# Patient Record
Sex: FEMALE | Race: WHITE | Employment: OTHER | URBAN - METROPOLITAN AREA
[De-identification: names, ages, dates, MRNs, and addresses within clinical notes are randomized per-mention and may not be internally consistent; named-entity substitution may affect disease eponyms.]

---

## 2018-07-23 ENCOUNTER — HOSPITAL ENCOUNTER (OUTPATIENT)
Age: 48
Discharge: HOME | End: 2018-07-25
Payer: COMMERCIAL

## 2018-07-23 PROBLEM — Z91.09 ENVIRONMENTAL ALLERGIES: Status: ACTIVE | Noted: 2018-07-23

## 2018-07-23 PROBLEM — F17.211 CIGARETTE NICOTINE DEPENDENCE IN REMISSION: Status: ACTIVE | Noted: 2018-07-23

## 2018-07-23 PROBLEM — J45.30 MILD PERSISTENT ASTHMA WITHOUT COMPLICATION: Status: ACTIVE | Noted: 2018-07-23

## 2018-07-23 PROBLEM — J30.89 CHRONIC NON-SEASONAL ALLERGIC RHINITIS: Status: ACTIVE | Noted: 2018-07-23

## 2018-07-24 LAB
BASOPHILS ABSOLUTE: 0.09 E9/L (ref 0–0.2)
BASOPHILS RELATIVE PERCENT: 0.9 % (ref 0–2)
EOSINOPHILS ABSOLUTE: 0.17 E9/L (ref 0.05–0.5)
EOSINOPHILS RELATIVE PERCENT: 1.7 % (ref 0–6)
HCT VFR BLD CALC: 45 % (ref 34–48)
HEMOGLOBIN: 14.1 G/DL (ref 11.5–15.5)
IMMATURE GRANULOCYTES #: 0.08 E9/L
IMMATURE GRANULOCYTES %: 0.8 % (ref 0–5)
LYMPHOCYTES ABSOLUTE: 2.37 E9/L (ref 1.5–4)
LYMPHOCYTES RELATIVE PERCENT: 23.7 % (ref 20–42)
MCH RBC QN AUTO: 32 PG (ref 26–35)
MCHC RBC AUTO-ENTMCNC: 31.3 % (ref 32–34.5)
MCV RBC AUTO: 102 FL (ref 80–99.9)
MONOCYTES ABSOLUTE: 0.6 E9/L (ref 0.1–0.95)
MONOCYTES RELATIVE PERCENT: 6 % (ref 2–12)
NEUTROPHILS ABSOLUTE: 6.69 E9/L (ref 1.8–7.3)
NEUTROPHILS RELATIVE PERCENT: 66.9 % (ref 43–80)
PDW BLD-RTO: 12.9 FL (ref 11.5–15)
PLATELET # BLD: 366 E9/L (ref 130–450)
PMV BLD AUTO: 12.7 FL (ref 7–12)
RBC # BLD: 4.41 E12/L (ref 3.5–5.5)
WBC # BLD: 10 E9/L (ref 4.5–11.5)

## 2018-07-24 PROCEDURE — 82785 ASSAY OF IGE: CPT

## 2018-07-24 PROCEDURE — 85025 COMPLETE CBC W/AUTO DIFF WBC: CPT

## 2018-07-24 PROCEDURE — 86003 ALLG SPEC IGE CRUDE XTRC EA: CPT

## 2018-07-31 LAB
Lab: NORMAL
REPORT: NORMAL
THIS TEST SENT TO: NORMAL

## 2019-03-18 ENCOUNTER — OFFICE VISIT (OUTPATIENT)
Dept: NEUROLOGY | Age: 49
End: 2019-03-18

## 2019-03-18 VITALS
SYSTOLIC BLOOD PRESSURE: 130 MMHG | BODY MASS INDEX: 26.66 KG/M2 | HEIGHT: 69 IN | WEIGHT: 180 LBS | DIASTOLIC BLOOD PRESSURE: 90 MMHG | RESPIRATION RATE: 12 BRPM

## 2019-03-18 PROCEDURE — 99204 OFFICE O/P NEW MOD 45 MIN: CPT | Performed by: PSYCHIATRY & NEUROLOGY

## 2019-03-18 RX ORDER — SUMATRIPTAN 50 MG/1
50 TABLET, FILM COATED ORAL
Qty: 9 TABLET | Refills: 2 | Status: SHIPPED | OUTPATIENT
Start: 2019-03-18 | End: 2019-05-08

## 2019-03-18 ASSESSMENT — ENCOUNTER SYMPTOMS
ALLERGIC/IMMUNOLOGIC NEGATIVE: 1
GASTROINTESTINAL NEGATIVE: 1
RESPIRATORY NEGATIVE: 1
EYES NEGATIVE: 1

## 2020-12-17 ENCOUNTER — APPOINTMENT (OUTPATIENT)
Dept: GENERAL RADIOLOGY | Age: 50
DRG: 493 | End: 2020-12-17
Payer: COMMERCIAL

## 2020-12-17 ENCOUNTER — HOSPITAL ENCOUNTER (INPATIENT)
Age: 50
LOS: 1 days | Discharge: HOME | DRG: 493 | End: 2020-12-20
Attending: EMERGENCY MEDICINE | Admitting: INTERNAL MEDICINE
Payer: COMMERCIAL

## 2020-12-17 PROBLEM — S93.05XA ANKLE DISLOCATION, LEFT, INITIAL ENCOUNTER: Status: ACTIVE | Noted: 2020-12-17

## 2020-12-17 PROCEDURE — 96376 TX/PRO/DX INJ SAME DRUG ADON: CPT

## 2020-12-17 PROCEDURE — 73610 X-RAY EXAM OF ANKLE: CPT

## 2020-12-17 PROCEDURE — 99285 EMERGENCY DEPT VISIT HI MDM: CPT

## 2020-12-17 PROCEDURE — 96375 TX/PRO/DX INJ NEW DRUG ADDON: CPT

## 2020-12-17 PROCEDURE — 27762 CLTX MED ANKLE FX W/MNPJ: CPT

## 2020-12-17 PROCEDURE — G0378 HOSPITAL OBSERVATION PER HR: HCPCS

## 2020-12-17 PROCEDURE — 96374 THER/PROPH/DIAG INJ IV PUSH: CPT

## 2020-12-17 PROCEDURE — 6370000000 HC RX 637 (ALT 250 FOR IP): Performed by: INTERNAL MEDICINE

## 2020-12-17 PROCEDURE — 6360000002 HC RX W HCPCS: Performed by: STUDENT IN AN ORGANIZED HEALTH CARE EDUCATION/TRAINING PROGRAM

## 2020-12-17 PROCEDURE — 6360000002 HC RX W HCPCS: Performed by: INTERNAL MEDICINE

## 2020-12-17 PROCEDURE — 2500000003 HC RX 250 WO HCPCS: Performed by: STUDENT IN AN ORGANIZED HEALTH CARE EDUCATION/TRAINING PROGRAM

## 2020-12-17 PROCEDURE — 73600 X-RAY EXAM OF ANKLE: CPT

## 2020-12-17 PROCEDURE — 6360000002 HC RX W HCPCS

## 2020-12-17 RX ORDER — SODIUM CHLORIDE 0.9 % (FLUSH) 0.9 %
10 SYRINGE (ML) INJECTION EVERY 12 HOURS SCHEDULED
Status: DISCONTINUED | OUTPATIENT
Start: 2020-12-17 | End: 2020-12-20 | Stop reason: HOSPADM

## 2020-12-17 RX ORDER — ZOLPIDEM TARTRATE 5 MG/1
5 TABLET ORAL NIGHTLY PRN
Status: DISCONTINUED | OUTPATIENT
Start: 2020-12-17 | End: 2020-12-20 | Stop reason: HOSPADM

## 2020-12-17 RX ORDER — FENTANYL CITRATE 50 UG/ML
75 INJECTION, SOLUTION INTRAMUSCULAR; INTRAVENOUS ONCE
Status: COMPLETED | OUTPATIENT
Start: 2020-12-17 | End: 2020-12-17

## 2020-12-17 RX ORDER — ACETAMINOPHEN 325 MG/1
650 TABLET ORAL EVERY 6 HOURS PRN
Status: DISCONTINUED | OUTPATIENT
Start: 2020-12-17 | End: 2020-12-20 | Stop reason: HOSPADM

## 2020-12-17 RX ORDER — VALSARTAN 80 MG/1
80 TABLET ORAL DAILY
Status: DISCONTINUED | OUTPATIENT
Start: 2020-12-18 | End: 2020-12-20 | Stop reason: HOSPADM

## 2020-12-17 RX ORDER — SODIUM CHLORIDE 0.9 % (FLUSH) 0.9 %
10 SYRINGE (ML) INJECTION PRN
Status: DISCONTINUED | OUTPATIENT
Start: 2020-12-17 | End: 2020-12-20 | Stop reason: HOSPADM

## 2020-12-17 RX ORDER — ACETAMINOPHEN 650 MG/1
650 SUPPOSITORY RECTAL EVERY 6 HOURS PRN
Status: DISCONTINUED | OUTPATIENT
Start: 2020-12-17 | End: 2020-12-20 | Stop reason: HOSPADM

## 2020-12-17 RX ORDER — PROMETHAZINE HYDROCHLORIDE 25 MG/1
12.5 TABLET ORAL EVERY 6 HOURS PRN
Status: DISCONTINUED | OUTPATIENT
Start: 2020-12-17 | End: 2020-12-20 | Stop reason: HOSPADM

## 2020-12-17 RX ORDER — OMEPRAZOLE 10 MG/1
10 CAPSULE, DELAYED RELEASE ORAL DAILY
Status: DISCONTINUED | OUTPATIENT
Start: 2020-12-18 | End: 2020-12-17 | Stop reason: CLARIF

## 2020-12-17 RX ORDER — LAMOTRIGINE 100 MG/1
200 TABLET ORAL DAILY
Status: DISCONTINUED | OUTPATIENT
Start: 2020-12-18 | End: 2020-12-20 | Stop reason: HOSPADM

## 2020-12-17 RX ORDER — MIDAZOLAM HYDROCHLORIDE 1 MG/ML
INJECTION INTRAMUSCULAR; INTRAVENOUS
Status: COMPLETED
Start: 2020-12-17 | End: 2020-12-17

## 2020-12-17 RX ORDER — BUPROPION HYDROCHLORIDE 100 MG/1
100 TABLET, EXTENDED RELEASE ORAL DAILY
Status: DISCONTINUED | OUTPATIENT
Start: 2020-12-18 | End: 2020-12-20 | Stop reason: HOSPADM

## 2020-12-17 RX ORDER — KETAMINE HYDROCHLORIDE 10 MG/ML
INJECTION, SOLUTION INTRAMUSCULAR; INTRAVENOUS DAILY PRN
Status: COMPLETED | OUTPATIENT
Start: 2020-12-17 | End: 2020-12-17

## 2020-12-17 RX ORDER — POTASSIUM CHLORIDE 7.45 MG/ML
10 INJECTION INTRAVENOUS PRN
Status: DISCONTINUED | OUTPATIENT
Start: 2020-12-17 | End: 2020-12-20 | Stop reason: HOSPADM

## 2020-12-17 RX ORDER — POTASSIUM CHLORIDE 20 MEQ/1
40 TABLET, EXTENDED RELEASE ORAL PRN
Status: DISCONTINUED | OUTPATIENT
Start: 2020-12-17 | End: 2020-12-20 | Stop reason: HOSPADM

## 2020-12-17 RX ORDER — ONDANSETRON 2 MG/ML
4 INJECTION INTRAMUSCULAR; INTRAVENOUS EVERY 6 HOURS PRN
Status: DISCONTINUED | OUTPATIENT
Start: 2020-12-17 | End: 2020-12-20 | Stop reason: HOSPADM

## 2020-12-17 RX ORDER — MIDAZOLAM HYDROCHLORIDE 2 MG/2ML
2 INJECTION, SOLUTION INTRAMUSCULAR; INTRAVENOUS ONCE
Status: DISCONTINUED | OUTPATIENT
Start: 2020-12-17 | End: 2020-12-20 | Stop reason: HOSPADM

## 2020-12-17 RX ORDER — OMEPRAZOLE 10 MG/1
10 CAPSULE, DELAYED RELEASE ORAL DAILY
COMMUNITY
End: 2022-02-10 | Stop reason: ALTCHOICE

## 2020-12-17 RX ORDER — PANTOPRAZOLE SODIUM 20 MG/1
20 TABLET, DELAYED RELEASE ORAL
Status: DISCONTINUED | OUTPATIENT
Start: 2020-12-18 | End: 2020-12-20 | Stop reason: HOSPADM

## 2020-12-17 RX ORDER — KETAMINE HYDROCHLORIDE 10 MG/ML
2 INJECTION, SOLUTION INTRAMUSCULAR; INTRAVENOUS ONCE
Status: COMPLETED | OUTPATIENT
Start: 2020-12-17 | End: 2020-12-17

## 2020-12-17 RX ORDER — MIDAZOLAM HYDROCHLORIDE 2 MG/2ML
2 INJECTION, SOLUTION INTRAMUSCULAR; INTRAVENOUS ONCE
Status: COMPLETED | OUTPATIENT
Start: 2020-12-17 | End: 2020-12-17

## 2020-12-17 RX ORDER — FENTANYL CITRATE 50 UG/ML
50 INJECTION, SOLUTION INTRAMUSCULAR; INTRAVENOUS ONCE
Status: COMPLETED | OUTPATIENT
Start: 2020-12-17 | End: 2020-12-17

## 2020-12-17 RX ORDER — ALBUTEROL SULFATE 0.63 MG/3ML
1 SOLUTION RESPIRATORY (INHALATION) EVERY 6 HOURS PRN
Status: DISCONTINUED | OUTPATIENT
Start: 2020-12-17 | End: 2020-12-20 | Stop reason: HOSPADM

## 2020-12-17 RX ORDER — MONTELUKAST SODIUM 10 MG/1
10 TABLET ORAL NIGHTLY
Status: DISCONTINUED | OUTPATIENT
Start: 2020-12-17 | End: 2020-12-20 | Stop reason: HOSPADM

## 2020-12-17 RX ORDER — KETAMINE HYDROCHLORIDE 10 MG/ML
20 INJECTION, SOLUTION INTRAMUSCULAR; INTRAVENOUS ONCE
Status: COMPLETED | OUTPATIENT
Start: 2020-12-17 | End: 2020-12-17

## 2020-12-17 RX ORDER — SENNA PLUS 8.6 MG/1
1 TABLET ORAL DAILY PRN
Status: DISCONTINUED | OUTPATIENT
Start: 2020-12-17 | End: 2020-12-20 | Stop reason: HOSPADM

## 2020-12-17 RX ADMIN — HYDROMORPHONE HYDROCHLORIDE 1 MG: 1 INJECTION, SOLUTION INTRAMUSCULAR; INTRAVENOUS; SUBCUTANEOUS at 19:56

## 2020-12-17 RX ADMIN — KETAMINE HYDROCHLORIDE 190.6 MG: 10 INJECTION, SOLUTION INTRAMUSCULAR; INTRAVENOUS at 17:52

## 2020-12-17 RX ADMIN — KETAMINE HYDROCHLORIDE 190.6 MG: 10 INJECTION INTRAMUSCULAR; INTRAVENOUS at 18:02

## 2020-12-17 RX ADMIN — FENTANYL CITRATE 75 MCG: 50 INJECTION, SOLUTION INTRAMUSCULAR; INTRAVENOUS at 22:55

## 2020-12-17 RX ADMIN — HYDROMORPHONE HYDROCHLORIDE 1 MG: 1 INJECTION, SOLUTION INTRAMUSCULAR; INTRAVENOUS; SUBCUTANEOUS at 16:17

## 2020-12-17 RX ADMIN — MIDAZOLAM HYDROCHLORIDE 2 MG: 1 INJECTION, SOLUTION INTRAMUSCULAR; INTRAVENOUS at 18:03

## 2020-12-17 RX ADMIN — FENTANYL CITRATE 50 MCG: 50 INJECTION, SOLUTION INTRAMUSCULAR; INTRAVENOUS at 18:29

## 2020-12-17 RX ADMIN — HYDROMORPHONE HYDROCHLORIDE 1 MG: 1 INJECTION, SOLUTION INTRAMUSCULAR; INTRAVENOUS; SUBCUTANEOUS at 23:39

## 2020-12-17 RX ADMIN — MONTELUKAST SODIUM 10 MG: 10 TABLET, FILM COATED ORAL at 23:41

## 2020-12-17 RX ADMIN — MIDAZOLAM HYDROCHLORIDE 2 MG: 1 INJECTION, SOLUTION INTRAMUSCULAR; INTRAVENOUS at 17:58

## 2020-12-17 RX ADMIN — MIDAZOLAM HYDROCHLORIDE 2 MG: 2 INJECTION, SOLUTION INTRAMUSCULAR; INTRAVENOUS at 17:58

## 2020-12-17 RX ADMIN — KETAMINE HYDROCHLORIDE 20 MG: 10 INJECTION, SOLUTION INTRAMUSCULAR; INTRAVENOUS at 21:22

## 2020-12-17 ASSESSMENT — PAIN DESCRIPTION - ORIENTATION
ORIENTATION: LEFT
ORIENTATION: LEFT

## 2020-12-17 ASSESSMENT — PAIN SCALES - GENERAL
PAINLEVEL_OUTOF10: 10
PAINLEVEL_OUTOF10: 7
PAINLEVEL_OUTOF10: 10
PAINLEVEL_OUTOF10: 6

## 2020-12-17 ASSESSMENT — PAIN DESCRIPTION - LOCATION
LOCATION: LEG;ANKLE
LOCATION: ANKLE

## 2020-12-17 ASSESSMENT — PAIN DESCRIPTION - DESCRIPTORS
DESCRIPTORS: ACHING;STABBING
DESCRIPTORS: STABBING

## 2020-12-17 ASSESSMENT — PAIN DESCRIPTION - FREQUENCY
FREQUENCY: CONTINUOUS
FREQUENCY: CONTINUOUS

## 2020-12-17 ASSESSMENT — PAIN DESCRIPTION - PAIN TYPE
TYPE: ACUTE PAIN
TYPE: ACUTE PAIN

## 2020-12-18 ENCOUNTER — ANESTHESIA EVENT (OUTPATIENT)
Dept: OPERATING ROOM | Age: 50
DRG: 493 | End: 2020-12-18
Payer: COMMERCIAL

## 2020-12-18 ENCOUNTER — APPOINTMENT (OUTPATIENT)
Dept: GENERAL RADIOLOGY | Age: 50
DRG: 493 | End: 2020-12-18
Payer: COMMERCIAL

## 2020-12-18 PROBLEM — R33.9 URINARY RETENTION: Status: ACTIVE | Noted: 2020-12-18

## 2020-12-18 LAB
ALBUMIN SERPL-MCNC: 4.2 G/DL (ref 3.5–5.2)
ALP BLD-CCNC: 89 U/L (ref 35–104)
ALT SERPL-CCNC: 12 U/L (ref 0–32)
ANION GAP SERPL CALCULATED.3IONS-SCNC: 8 MMOL/L (ref 7–16)
AST SERPL-CCNC: 20 U/L (ref 0–31)
BASOPHILS ABSOLUTE: 0.07 E9/L (ref 0–0.2)
BASOPHILS RELATIVE PERCENT: 0.8 % (ref 0–2)
BILIRUB SERPL-MCNC: 0.6 MG/DL (ref 0–1.2)
BUN BLDV-MCNC: 8 MG/DL (ref 6–20)
CALCIUM SERPL-MCNC: 9.4 MG/DL (ref 8.6–10.2)
CHLORIDE BLD-SCNC: 103 MMOL/L (ref 98–107)
CO2: 26 MMOL/L (ref 22–29)
CREAT SERPL-MCNC: 0.9 MG/DL (ref 0.5–1)
EOSINOPHILS ABSOLUTE: 0.18 E9/L (ref 0.05–0.5)
EOSINOPHILS RELATIVE PERCENT: 2 % (ref 0–6)
GFR AFRICAN AMERICAN: >60
GFR NON-AFRICAN AMERICAN: >60 ML/MIN/1.73
GLUCOSE BLD-MCNC: 102 MG/DL (ref 74–99)
HCT VFR BLD CALC: 41.7 % (ref 34–48)
HEMOGLOBIN: 13.3 G/DL (ref 11.5–15.5)
IMMATURE GRANULOCYTES #: 0.07 E9/L
IMMATURE GRANULOCYTES %: 0.8 % (ref 0–5)
LYMPHOCYTES ABSOLUTE: 2.29 E9/L (ref 1.5–4)
LYMPHOCYTES RELATIVE PERCENT: 24.8 % (ref 20–42)
MCH RBC QN AUTO: 32.5 PG (ref 26–35)
MCHC RBC AUTO-ENTMCNC: 31.9 % (ref 32–34.5)
MCV RBC AUTO: 102 FL (ref 80–99.9)
MONOCYTES ABSOLUTE: 0.94 E9/L (ref 0.1–0.95)
MONOCYTES RELATIVE PERCENT: 10.2 % (ref 2–12)
NEUTROPHILS ABSOLUTE: 5.68 E9/L (ref 1.8–7.3)
NEUTROPHILS RELATIVE PERCENT: 61.4 % (ref 43–80)
PDW BLD-RTO: 13.2 FL (ref 11.5–15)
PLATELET # BLD: 337 E9/L (ref 130–450)
PMV BLD AUTO: 11.1 FL (ref 7–12)
POTASSIUM REFLEX MAGNESIUM: 3.7 MMOL/L (ref 3.5–5)
RBC # BLD: 4.09 E12/L (ref 3.5–5.5)
SODIUM BLD-SCNC: 137 MMOL/L (ref 132–146)
TOTAL PROTEIN: 7.2 G/DL (ref 6.4–8.3)
WBC # BLD: 9.2 E9/L (ref 4.5–11.5)

## 2020-12-18 PROCEDURE — 6370000000 HC RX 637 (ALT 250 FOR IP): Performed by: INTERNAL MEDICINE

## 2020-12-18 PROCEDURE — 6360000002 HC RX W HCPCS: Performed by: ORTHOPAEDIC SURGERY

## 2020-12-18 PROCEDURE — 96376 TX/PRO/DX INJ SAME DRUG ADON: CPT

## 2020-12-18 PROCEDURE — 96375 TX/PRO/DX INJ NEW DRUG ADDON: CPT

## 2020-12-18 PROCEDURE — G0378 HOSPITAL OBSERVATION PER HR: HCPCS

## 2020-12-18 PROCEDURE — 6360000002 HC RX W HCPCS: Performed by: STUDENT IN AN ORGANIZED HEALTH CARE EDUCATION/TRAINING PROGRAM

## 2020-12-18 PROCEDURE — 6360000002 HC RX W HCPCS: Performed by: INTERNAL MEDICINE

## 2020-12-18 PROCEDURE — 96372 THER/PROPH/DIAG INJ SC/IM: CPT

## 2020-12-18 PROCEDURE — 51701 INSERT BLADDER CATHETER: CPT

## 2020-12-18 PROCEDURE — 36415 COLL VENOUS BLD VENIPUNCTURE: CPT

## 2020-12-18 PROCEDURE — 2580000003 HC RX 258: Performed by: INTERNAL MEDICINE

## 2020-12-18 PROCEDURE — 85025 COMPLETE CBC W/AUTO DIFF WBC: CPT

## 2020-12-18 PROCEDURE — 80053 COMPREHEN METABOLIC PANEL: CPT

## 2020-12-18 PROCEDURE — 6370000000 HC RX 637 (ALT 250 FOR IP): Performed by: ORTHOPAEDIC SURGERY

## 2020-12-18 PROCEDURE — 51798 US URINE CAPACITY MEASURE: CPT

## 2020-12-18 RX ORDER — LORAZEPAM 2 MG/ML
0.5 INJECTION INTRAMUSCULAR EVERY 6 HOURS PRN
Status: DISCONTINUED | OUTPATIENT
Start: 2020-12-18 | End: 2020-12-20 | Stop reason: HOSPADM

## 2020-12-18 RX ORDER — KETOROLAC TROMETHAMINE 30 MG/ML
30 INJECTION, SOLUTION INTRAMUSCULAR; INTRAVENOUS ONCE
Status: COMPLETED | OUTPATIENT
Start: 2020-12-18 | End: 2020-12-18

## 2020-12-18 RX ORDER — OXYCODONE HYDROCHLORIDE AND ACETAMINOPHEN 5; 325 MG/1; MG/1
2 TABLET ORAL EVERY 4 HOURS PRN
Status: DISCONTINUED | OUTPATIENT
Start: 2020-12-18 | End: 2020-12-18

## 2020-12-18 RX ORDER — OXYCODONE HYDROCHLORIDE AND ACETAMINOPHEN 5; 325 MG/1; MG/1
1 TABLET ORAL EVERY 4 HOURS PRN
Status: DISCONTINUED | OUTPATIENT
Start: 2020-12-18 | End: 2020-12-18

## 2020-12-18 RX ORDER — OXYCODONE HYDROCHLORIDE AND ACETAMINOPHEN 5; 325 MG/1; MG/1
2 TABLET ORAL EVERY 4 HOURS
Status: DISCONTINUED | OUTPATIENT
Start: 2020-12-18 | End: 2020-12-20 | Stop reason: HOSPADM

## 2020-12-18 RX ADMIN — SODIUM CHLORIDE, PRESERVATIVE FREE 10 ML: 5 INJECTION INTRAVENOUS at 19:58

## 2020-12-18 RX ADMIN — BUPROPION HYDROCHLORIDE 100 MG: 100 TABLET, EXTENDED RELEASE ORAL at 09:23

## 2020-12-18 RX ADMIN — OXYCODONE HYDROCHLORIDE AND ACETAMINOPHEN 2 TABLET: 5; 325 TABLET ORAL at 16:03

## 2020-12-18 RX ADMIN — Medication 10 ML: at 16:24

## 2020-12-18 RX ADMIN — Medication 10 ML: at 13:10

## 2020-12-18 RX ADMIN — LORAZEPAM 0.5 MG: 2 INJECTION INTRAMUSCULAR; INTRAVENOUS at 12:04

## 2020-12-18 RX ADMIN — Medication 10 ML: at 22:35

## 2020-12-18 RX ADMIN — OXYCODONE HYDROCHLORIDE AND ACETAMINOPHEN 2 TABLET: 5; 325 TABLET ORAL at 07:32

## 2020-12-18 RX ADMIN — ZOLPIDEM TARTRATE 5 MG: 5 TABLET ORAL at 20:08

## 2020-12-18 RX ADMIN — LAMOTRIGINE 200 MG: 100 TABLET ORAL at 09:23

## 2020-12-18 RX ADMIN — ZOLPIDEM TARTRATE 5 MG: 5 TABLET ORAL at 01:24

## 2020-12-18 RX ADMIN — VALSARTAN 80 MG: 80 TABLET, FILM COATED ORAL at 09:23

## 2020-12-18 RX ADMIN — HYDROMORPHONE HYDROCHLORIDE 1 MG: 1 INJECTION, SOLUTION INTRAMUSCULAR; INTRAVENOUS; SUBCUTANEOUS at 13:18

## 2020-12-18 RX ADMIN — SODIUM CHLORIDE, PRESERVATIVE FREE 10 ML: 5 INJECTION INTRAVENOUS at 09:19

## 2020-12-18 RX ADMIN — PANTOPRAZOLE SODIUM 20 MG: 20 TABLET, DELAYED RELEASE ORAL at 09:22

## 2020-12-18 RX ADMIN — MONTELUKAST SODIUM 10 MG: 10 TABLET, FILM COATED ORAL at 19:57

## 2020-12-18 RX ADMIN — SODIUM CHLORIDE, PRESERVATIVE FREE 10 ML: 5 INJECTION INTRAVENOUS at 02:45

## 2020-12-18 RX ADMIN — HYDROMORPHONE HYDROCHLORIDE 1 MG: 1 INJECTION, SOLUTION INTRAMUSCULAR; INTRAVENOUS; SUBCUTANEOUS at 09:18

## 2020-12-18 RX ADMIN — OXYCODONE HYDROCHLORIDE AND ACETAMINOPHEN 2 TABLET: 5; 325 TABLET ORAL at 23:40

## 2020-12-18 RX ADMIN — OXYCODONE HYDROCHLORIDE AND ACETAMINOPHEN 2 TABLET: 5; 325 TABLET ORAL at 12:50

## 2020-12-18 RX ADMIN — HYDROMORPHONE HYDROCHLORIDE 1 MG: 1 INJECTION, SOLUTION INTRAMUSCULAR; INTRAVENOUS; SUBCUTANEOUS at 02:38

## 2020-12-18 RX ADMIN — HYDROMORPHONE HYDROCHLORIDE 1 MG: 1 INJECTION, SOLUTION INTRAMUSCULAR; INTRAVENOUS; SUBCUTANEOUS at 05:54

## 2020-12-18 RX ADMIN — ACETAMINOPHEN 650 MG: 325 TABLET ORAL at 01:24

## 2020-12-18 RX ADMIN — OXYCODONE HYDROCHLORIDE AND ACETAMINOPHEN 2 TABLET: 5; 325 TABLET ORAL at 19:57

## 2020-12-18 RX ADMIN — SERTRALINE HYDROCHLORIDE 75 MG: 50 TABLET ORAL at 09:22

## 2020-12-18 RX ADMIN — LORAZEPAM 0.5 MG: 2 INJECTION INTRAMUSCULAR; INTRAVENOUS at 19:02

## 2020-12-18 RX ADMIN — KETOROLAC TROMETHAMINE 30 MG: 30 INJECTION, SOLUTION INTRAMUSCULAR at 15:56

## 2020-12-18 RX ADMIN — HYDROMORPHONE HYDROCHLORIDE 1 MG: 1 INJECTION, SOLUTION INTRAMUSCULAR; INTRAVENOUS; SUBCUTANEOUS at 22:35

## 2020-12-18 RX ADMIN — HYDROMORPHONE HYDROCHLORIDE 1 MG: 1 INJECTION, SOLUTION INTRAMUSCULAR; INTRAVENOUS; SUBCUTANEOUS at 16:24

## 2020-12-18 RX ADMIN — SENNOSIDES 8.6 MG: 8.6 TABLET, FILM COATED ORAL at 09:22

## 2020-12-18 ASSESSMENT — PAIN SCALES - GENERAL
PAINLEVEL_OUTOF10: 10
PAINLEVEL_OUTOF10: 10
PAINLEVEL_OUTOF10: 9
PAINLEVEL_OUTOF10: 7
PAINLEVEL_OUTOF10: 7
PAINLEVEL_OUTOF10: 9
PAINLEVEL_OUTOF10: 10
PAINLEVEL_OUTOF10: 8
PAINLEVEL_OUTOF10: 10
PAINLEVEL_OUTOF10: 10
PAINLEVEL_OUTOF10: 9
PAINLEVEL_OUTOF10: 10

## 2020-12-18 ASSESSMENT — LIFESTYLE VARIABLES: SMOKING_STATUS: 0

## 2020-12-18 NOTE — PATIENT CARE CONFERENCE
[facility] Quality Flow/Interdisciplinary Rounds Progress Note        Quality Flow Rounds held on December 18, 2020    Disciplines Attending:  Bedside Nurse, ,  and Nursing Unit Leadership    Rush Barragan was admitted on 12/17/2020  3:31 PM    Anticipated Discharge Date:  Expected Discharge Date: (N/A)    Disposition:    Fortino Score:  Fortino Scale Score: 19    Readmission Risk              Risk of Unplanned Readmission:        0           Discussed patient goal for the day, patient clinical progression, and barriers to discharge. The following Goal(s) of the Day/Commitment(s) have been identified:  Await Urology consult input. Adequate post st cath void.       Raheem Doss  December 18, 2020

## 2020-12-18 NOTE — ED PROVIDER NOTES
HPI:     Windy Olivas is a 53 y.o. female presenting to the ED for ankle injury, beginning just prior to arrival.  The complaint has been constant, severe in severity, and worsened by nothing. Patient states that she slipped while inside and tripped and now her ankle is deformed. States she is having excruciating ankle pain on the left side but otherwise denies any symptoms including headache, dizziness, fever, chest pain, cough, nausea, vomiting, abdominal pain, diarrhea, constipation, urinary symptoms. States that the fall was mechanical.    Review of Systems:   Please see HPI above. All bolded are positive. All un-bolded are negative. Constitutional Symptoms: fever, chills, fatigue, generalized weakness, diaphoresis, increase in thirst, loss of appetite  Eyes: vision change   Ears, Nose, Mouth, Throat: hearing loss, nasal congestion, sores in the mouth  Cardiovascular: chest pain, chest heaviness, palpitations  Respiratory: shortness of breath, wheezing, coughing  Gastrointestinal: abdominal pain, nausea, vomiting, diarrhea, constipation, melena, hematochezia, hematemesis  Genitourinary: dysuria, hematuria, increased frequency  Musculoskeletal: lower extremity edema, myalgias, arthralgias, back pain, left ankle deformity  Integumentary: rashes, itching   Neurological: headache, lightheadedness, dizziness, confusion, syncope, numbness, tingling, focal weakness  Psychiatric: depression, suicidal ideation, anxiety  Endocrine: unintentional weight change  Hematologic/Lymphatic: lymphadenopathy, easy bruising, easy bleeding   Allergic/Immunologic: recurrent infections            --------------------------------------------- PAST HISTORY ---------------------------------------------  Past Medical History:  has a past medical history of Allergic rhinitis, Asthma, Depressed bipolar I disorder ([facility] [address] Hypertension, and Obsessive reaction.     Past Surgical History:  has a past surgical history that includes pulses  Abdomen: Soft, non tender, non distended,   Extremities: No deformity noted to left ankle, there is to be lateral dislocation of the ankle joint. Excruciatingly painful. Patient does however have palpable DP pulse. PT pulse was obtained with Doppler. Compartments are soft and intact. Patient does have full sensation, however range of motion is limited secondary to pain. Skin: warm and dry without rash  Neurologic: GCS 15,  Psych: Normal Affect        PROCEDURES:  Conscious Sedation Procedure Note    Indication: ankle dislocation    Consent: I have discussed with the patient and/or the patient representative the indication, alternatives, and the possible risks and/or complications of the planned procedure and the anesthesia methods. The patient and/or patient representative appear to understand and agree to proceed. Physician Involvement: The attending physician was present and supervising this procedure. Pre-Sedation Documentation and Exam:  I have personally completed a history, physical exam & review of systems for this patient (see notes). Airway Assessment: Mallampati Class III - (soft palate & base of uvula are visible)    Prior History of Anesthesia Complications: none    ASA Classification: Class 1 - A normal healthy patient    Sedation/ Anesthesia Plan: intravenous sedation    Medications Used: ketamine intravenously    Monitoring and Safety: The patient was placed on a cardiac monitor and vital signs, pulse oximetry and level of consciousness were continuously evaluated throughout the procedure. The patient was closely monitored until recovery from the medications was complete and the patient had returned to baseline status. Respiratory therapy was on standby at all times during the procedure.     (The following sections must be completed)  Post-Sedation Vital Signs: Vital signs were reviewed and were stable after the procedure (see flow sheet for vitals)            Post-Sedation Exam: has dislocation of the talus as well as bimalleolar fracture. Did speak to Dr. Kaylyn Tom is agreeable with my reducing the fracture he states that he will follow-up outpatient, fracture dislocation was reduced. Plan was initially to discharge patient home with outpatient follow-up, however she is intractable pain. She has received multiple doses of Dilaudid and fentanyl and she is still in excruciating pain. I feel patient would benefit from admission for pain control. Did speak to Dr. Kaylyn Tom who requested that I have medicine admit the patient. I did speak to Dr. Camryn Garcia who is agreeable to admission. All questions were answered at this time. Patient is agreeable as well. Counseling: The emergency provider has spoken with the patient and discussed todays results, in addition to providing specific details for the plan of care and counseling regarding the diagnosis and prognosis. Questions are answered at this time and they are agreeable with the plan.      --------------------------------- IMPRESSION AND DISPOSITION ---------------------------------    IMPRESSION  1. Closed dislocation of left talus, initial encounter    2. Closed bimalleolar fracture of left ankle, initial encounter        New Prescriptions    No medications on file       DISPOSITION  Disposition: Admit to med/surg floor  Patient condition is stable      NOTE: This report was transcribed using voice recognition software.  Every effort was made to ensure accuracy; however, inadvertent computerized transcription errors may be present       Marla Causey DO  Resident  12/17/20 [id number]

## 2020-12-18 NOTE — PROGRESS NOTES
Patient is reporting pain level 10/10 and crying from extreme pain. Patient has been given pain medications (see mar) and it is not controlling her pain. Perfect serve call placed to Dr. Chris Gauthier as patient has not been seen by ortho yet.  Voicemail left

## 2020-12-18 NOTE — ANESTHESIA PRE PROCEDURE
 LORazepam (ATIVAN) injection 0.5 mg  0.5 mg Intravenous Q6H PRN Yamel Wei MD   0.5 mg at 12/18/20 1204    midazolam PF (VERSED) injection 2 mg  2 mg Intravenous Once Jared Juan Manuel, DO   Stopped at 12/17/20 1948    albuterol (ACCUNEB) nebulizer solution 0.63 mg  1 ampule Nebulization Q6H PRN Fredy E Volino, DO        buPROPion [facility]) extended release tablet 100 mg  100 mg Oral Daily Fredy E Volino, DO   100 mg at 12/18/20 1803    lamoTRIgine (LAMICTAL) tablet 200 mg  200 mg Oral Daily Fredy E Volino, DO   200 mg at 12/18/20 0923    montelukast (SINGULAIR) tablet 10 mg  10 mg Oral Nightly Fredy E Volino, DO   10 mg at 12/17/20 2341    sertraline (ZOLOFT) tablet 75 mg  75 mg Oral Daily Fredy E Volino, DO   75 mg at 12/18/20 1366    valsartan (DIOVAN) tablet 80 mg  80 mg Oral Daily Fredy E Volino, DO   80 mg at 12/18/20 1146    zolpidem (AMBIEN) tablet 5 mg  5 mg Oral Nightly PRN Fredy E Volino, DO   5 mg at 12/18/20 0124    HYDROmorphone (DILAUDID) injection 0.5 mg  0.5 mg Intravenous Q3H PRN Fredy E Volino, DO        Or    HYDROmorphone (DILAUDID) injection 1 mg  1 mg Intravenous Q3H PRN Fredy E Volino, DO   1 mg at 12/18/20 1318    sodium chloride flush 0.9 % injection 10 mL  10 mL Intravenous 2 times per day Fredy E Volino, DO   10 mL at 12/18/20 0919    sodium chloride flush 0.9 % injection 10 mL  10 mL Intravenous PRN Fredy E Volino, DO   10 mL at 12/18/20 1310    potassium chloride (KLOR-CON M) extended release tablet 40 mEq  40 mEq Oral PRN Fredy E Volino, DO        Or    potassium bicarb-citric acid (EFFER-K) effervescent tablet 40 mEq  40 mEq Oral PRN Fredy E Volino, DO        Or    potassium chloride 10 mEq/100 mL IVPB (Peripheral Line)  10 mEq Intravenous PRN Fredy E Volino, DO        enoxaparin (LOVENOX) injection 40 mg  40 mg Subcutaneous Daily Fredy Cleaning DO        promethazine (PHENERGAN) tablet 12.5 mg  12.5 mg Oral Q6H PRN Fredy Cleaning DO        Or  ondansetron (ZOFRAN) injection 4 mg  4 mg Intravenous Q6H PRN Fredy E Volino, DO        senna (SENOKOT) tablet 8.6 mg  1 tablet Oral Daily PRN Fredy E Volino, DO   8.6 mg at 20 8987    acetaminophen (TYLENOL) tablet 650 mg  650 mg Oral Q6H PRN Fredy E Volino, DO   650 mg at 20 0124    Or    acetaminophen (TYLENOL) suppository 650 mg  650 mg Rectal Q6H PRN Fredy E Volino, DO        pantoprazole (PROTONIX) tablet 20 mg  20 mg Oral QAM AC Fredy E Volino, DO   20 mg at 20 3194       Allergies:  No Known Allergies    Problem List:    Patient Active Problem List   Diagnosis Code    Mild persistent asthma without complication D23.78    Chronic non-seasonal allergic rhinitis J30.89    Environmental allergies Z91.09    Cigarette nicotine dependence in remission F17.211    Ankle dislocation, left, initial encounter S93. 05XA    Urinary retention R33.9       Past Medical History:        Diagnosis Date    Allergic rhinitis     Asthma     Depressed bipolar I disorder ([facility] [address]     Hypertension     Obsessive reaction        Past Surgical History:        Procedure Laterality Date    ENDOMETRIAL ABLATION         Social History:    Social History     Tobacco Use    Smoking status: Former Smoker     Packs/day: 1.00     Years: 21.00     Pack years: 21.00     Types: Cigarettes     Quit date: 2008     Years since quittin.9    Smokeless tobacco: Never Used   Substance Use Topics    Alcohol use: Not Currently     Comment: a beer per day                                Counseling given: Not Answered      Vital Signs (Current):   Vitals:    20 2115 20 2241 20 2320 20 0732   BP: (!) 179/89 115/69 134/71 (!) 147/68   Pulse: 83 75 63 64   Resp:     Temp:  36.6 °C (97.8 °F) 36.7 °C (98 °F) 36.6 °C (97.9 °F)   TempSrc:  Oral Oral Oral   SpO2:  100%  99%   Weight:       Height:                                                  BP Readings from Last 3 Encounters: 12/18/20 (!) 147/68   10/21/20 122/74   02/07/20 (!) 142/88       NPO Status:                                                                                 BMI:   Wt Readings from Last 3 Encounters:   12/17/20 210 lb (95.3 kg)   02/07/20 216 lb (98 kg)   11/07/19 212 lb (96.2 kg)     Body mass index is 31.01 kg/m². CBC:   Lab Results   Component Value Date    WBC 9.2 12/18/2020    RBC 4.09 12/18/2020    HGB 13.3 12/18/2020    HCT 41.7 12/18/2020    .0 12/18/2020    RDW 13.2 12/18/2020     12/18/2020       CMP:   Lab Results   Component Value Date     12/18/2020    K 3.7 12/18/2020     12/18/2020    CO2 26 12/18/2020    BUN 8 12/18/2020    CREATININE 0.9 12/18/2020    GFRAA >60 12/18/2020    LABGLOM >60 12/18/2020    GLUCOSE 102 12/18/2020    PROT 7.2 12/18/2020    CALCIUM 9.4 12/18/2020    BILITOT 0.6 12/18/2020    ALKPHOS 89 12/18/2020    AST 20 12/18/2020    ALT 12 12/18/2020       POC Tests: No results for input(s): POCGLU, POCNA, POCK, POCCL, POCBUN, POCHEMO, POCHCT in the last 72 hours.     Coags: No results found for: PROTIME, INR, APTT    HCG (If Applicable): No results found for: PREGTESTUR, PREGSERUM, HCG, HCGQUANT     ABGs: No results found for: PHART, PO2ART, PFO6TRZ, THD3WSY, BEART, T0QEJGVG     Type & Screen (If Applicable):  No results found for: LABABO, LABRH    Drug/Infectious Status (If Applicable):  No results found for: HIV, HEPCAB    COVID-19 Screening (If Applicable): No results found for: COVID19     ECG 11/7/2019  Normal sinus with possible left atrial enlargement     Anesthesia Evaluation  Patient summary reviewed and Nursing notes reviewed no history of anesthetic complications:   Airway: Mallampati: III  TM distance: >3 FB   Neck ROM: full  Mouth opening: > = 3 FB Dental:      Comment: Denies loose, chipped or cracked     Pulmonary:normal exam  breath sounds clear to auscultation  (+) asthma (per pt she uses an inhaler about twice a week): seasonal asthma, (-) not a current smoker                           Cardiovascular:  Exercise tolerance: good (>4 METS),   (+) hypertension:, hyperlipidemia      ECG reviewed  Rhythm: regular  Rate: normal           Beta Blocker:  Dose within 24 Hrs         Neuro/Psych:   (+) psychiatric history: stable with treatment            GI/Hepatic/Renal:   (+) GERD: well controlled, PUD (Resolved.),          ROS comment: Hx of urinary retention with anesthesia. Has a son catheter in presently. .   Endo/Other: Negative Endo/Other ROS                    Abdominal:           Vascular: negative vascular ROS. Anesthesia Plan      general     ASA 2     (IV left arm  22  Pt agrees to GA--IV induction and ETTube. Has a piercing above left eyebrow--\" I don't know how to remove it\"--risks on keeping it in were discussed.)  Induction: inhalational and intravenous. MIPS: Postoperative opioids intended and Prophylactic antiemetics administered. Anesthetic plan and risks discussed with patient. Use of blood products discussed with patient whom consented to blood products. Plan discussed with CRNA and attending.     Attending anesthesiologist reviewed and agrees with Pre Eval content            Kristin Masterson RN   12/18/2020

## 2020-12-18 NOTE — H&P
Internal Medicine History & Physical     Name: Amie Josue  : 1970  Chief Complaint: Fall (fall at 3:30, complaints of left ankle pain)  Primary Care Physician: Collin Garrison MD  Admission date: 2020  Date of service: 2020     History of Present Illness  [facility] is a 53y.o. year old female. She has past medical history of depression. Patient stated that she was walking at work and fell. Patient states that she heard an audible snap of her left ankle. Was unable to walk afterwards. Noted that there was a clear deformity. Patient states that she is able to move her toes. Patient states that when she receives conscious sedation she has urinary retention. Patient had her ankle reduced and had conscious sedation during that time. Notes that she was unable to urinate for 14 hours. Patient then was straight cathed. Patient has relief of those symptoms. Patient has not urinated since then. Patient states that her pain is a 9 out of 10. Patient states that the Dilaudid medication helps slightly. Denies any exacerbating factors. Patient denies falling or hitting her head. Patient denies any fevers, chills, nausea, vomiting, chest pain, shortness of breath, abdominal pain. Patient stated that she would like to have surgery soon. There are no family or friends at bedside. The history is provided by patient. Patient is felt to be a good historian. ED course:   Initial blood work and imaging studies performed. Admission recommended by ED physician. Case discussed with ED provider.  Meds in ED consisted of the following:  Medications   midazolam PF (VERSED) injection 2 mg (0 mg Intravenous Held 20 194)   HYDROmorphone (DILAUDID) injection 1 mg (1 mg Intravenous Given 20 1617)   ketamine (KETALAR) injection 190.6 mg (190.6 mg Intravenous Given 20 1752)   midazolam (VERSED) 2 MG/2ML injection (2 mg  Given 20 1803)   ketamine (KETALAR) injection (190.6 mg Intravenous Given 12/17/20 1802)   midazolam PF (VERSED) injection 2 mg (2 mg Intravenous Given 12/17/20 1758)   fentaNYL (SUBLIMAZE) injection 50 mcg (50 mcg Intravenous Given 12/17/20 1829)   HYDROmorphone (DILAUDID) injection 1 mg (1 mg Intravenous Given 12/17/20 1956)   ketamine (KETALAR) injection 20 mg (20 mg Intravenous Given 12/17/20 2122)       Past Medical History:   Diagnosis Date    Allergic rhinitis     Asthma     Depressed bipolar I disorder ([address]     Hypertension     Obsessive reaction        Past Surgical History:   Procedure Laterality Date    ENDOMETRIAL ABLATION         Family History   Problem Relation Age of Onset    No Known Problems Mother     Heart Disease Father        Social History  Patient lives at home her  and 2 children. Employment: Works in a slaughterhouse  Illicit drug use- denies  TOBACCO:   reports that she quit smoking about 12 years ago. Her smoking use included cigarettes. She has a 21.00 pack-year smoking history. She has never used smokeless tobacco.  ETOH:   reports previous alcohol use. Home Medications  Prior to Admission medications    Medication Sig Start Date End Date Taking?  Authorizing Provider   omeprazole (PRILOSEC) 10 MG delayed release capsule Take 10 mg by mouth daily   Yes Historical Provider, MD   montelukast (SINGULAIR) 10 MG tablet take 1 tablet by mouth once daily 5/20/20  Yes Shoaib Crouch MD   valsartan (DIOVAN) 80 MG tablet Take 1 tablet by mouth daily 4/1/20  Yes Sobeida Leos MD   albuterol (ACCUNEB) 0.63 MG/3ML nebulizer solution Take 3 mLs by nebulization every 6 hours as needed for Wheezing 3/25/20  Yes Mehnaz Gutiérrez PA-C   sertraline (ZOLOFT) 50 MG tablet Take 75 mg by mouth daily Takes 1.5 tabs daily   Yes Historical Provider, MD   buPROPion (WELLBUTRIN SR) 100 MG extended release tablet Take 100 mg by mouth daily    Yes Historical Provider, MD   zolpidem (AMBIEN) 10 MG tablet Take 5 mg by mouth nightly as needed for normal, no CVA tenderness. Chest: no pain on palpation  Lungs: clear to auscultation bilaterally, without rhonchi, crackle, wheezing, or rale, no retractions or use of accessory muscles  Heart: regular rate and regular rhythm, no murmur, normal S1, S2  Abdomen: soft, non-tender; bowel sounds normal; no masses, no organomegaly  : Deferred   Extremities: no lower extremity edema, extremities atraumatic, no cyanosis, no clubbing, 2+ pedal pulses palpated. Left foot is splinted. Able to move toes. Good cap refill. Sensation intact. Skin: normal color, normal texture, normal turgor, no rashes, no lesions  Neurologic:5/5 muscle strength throughout, normal muscle tone throughout, face symmetric, hearing intact, tongue midline, speech appropriate without slurring, sensation to fine touch intact in upper and lower extremities    Labs-   Lab Results   Component Value Date    WBC 14.7 01/27/2020    HGB 15.1 01/27/2020    HCT 44.9 01/27/2020     07/24/2018     No results found for: CKTOTAL, CKMB, CKMBINDEX, TROPONINI    Recent Radiological Studies:  XR ANKLE LEFT (MIN 3 VIEWS)   Final Result   Postreduction radiographs, with improvement with respect to the degree of   ankle dislocation.       XR ANKLE LEFT (2 VIEWS)   Final Result   Anterolateral dislocation of the talus   Displaced medial and lateral malleolar fracture          Assessment  Active Hospital Problems    Diagnosis    Ankle dislocation, left, initial encounter [S93.05XA]     Priority: High    Urinary retention [R33.9]     Priority: Medium    Mild persistent asthma without complication [K57.25]    Chronic non-seasonal allergic rhinitis [J30.89]    Environmental allergies [Z91.09]    Cigarette nicotine dependence in remission [F17.211]       Patient Active Problem List    Diagnosis Date Noted    Ankle dislocation, left, initial encounter 12/17/2020     Priority: High    Urinary retention 12/18/2020     Priority: Medium    Mild persistent asthma without complication 74/23/1273    Chronic non-seasonal allergic rhinitis 07/23/2018    Environmental allergies 07/23/2018    Cigarette nicotine dependence in remission 07/23/2018       Plan  · Ankle fx/dislocation: Observation for pain control. Ortho consult. IV Dilaudid and PO Percocet prn pain. PT/OT  · Urinary retention-likely situational: Straight cath. Aldrich placed. Urology input appreciated. · Continue home medications  · Follow labs  · DVT prophylaxis. · Please see orders for further management and care. ·  for discharge planning  · Discharge plan: home soon     Patient was seen and evaluated by Resident Dr. Sunni Morales  Patient was seen in conjunction with Dr. Joy Ortega. Patient's note was done using dictation software and there may be some dictation errors secondary to this. Sunni Morales signed 12/18/2020 at 9:15 AM  EM PGY1    Addendum: I have personally participated in a face-to-face history and physical exam on the date of service with the patient. I have discussed the case with the resident. I also participated in medical decision making with the resident on the date of service and I agree with all of the pertinent clinical information unless indicated in my editing of the note. I have reviewed and edited the note above based on my findings during my history, exam, and decision making on the same day of service. My additional thoughts:    Defer further pain management to orthopedic surgery   The  wanted a phone call, but I felt it was more appropriate for orthopedic surgery to make that call. Urology input appreciated for   Urinary retention. Electronically signed by Lisette Vasquez DO on 12/18/2020 at 12:06 PM    I can be reached through [facility].

## 2020-12-18 NOTE — CARE COORDINATION
Social Work:    Reviewed chart notes. RN advised social service that Fran Quiñones has been tearful all morning and her  has been calling regularly checking in on her. Patient fell and fractured her left ankle. We are presently awaiting an ortho consult. Social service met with Fran Quiñones, offered support, explained social work role, and discussed discharge planning. 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 She resides at home with her  in a two-story home (2 entry steps) with her bedroom & bathroom located upstairs, half bath on the main floor. Social work discussed possible [facility] and DME depending on ortho consult. Shivani did not think she would need HHC but she is receptive to durable medical equipment recommended. Fran Quiñones expressed feeling badly because she feels she is being judged due to history of bipolar disorder & prior med list and her need for pain medications presently. She explains that she never takes pain meds but is in a great deal of pain. Social work offered support and sympathy, as well as acknowledged that everyone has a different threshold of pain that they can tolerate. Fran Quiñones was encouraged not feel badly as she is not being judged. Fran Quiñones was thankful for the visit. Social work to follow.     Electronically signed by JULIUS Mccarthy on 12/18/2020 at 12:36 PM

## 2020-12-18 NOTE — CONSULTS
Orthopaedic Consultation  Toshia Madrigal MD      CHIEF COMPLAINT:  \"Left ankle pain / injury\"    History of Present Illness:    53yo female admitted last night through ER for intractable pain associated with left ankle fx dislocation. Pre-reduction xrays show barber C level fibula fx + medial malleolus Fx (suspect pronation-ABD mechanism)  Post-reduction xrays show the talus to be reduced beneath tibial plafond. She is a prior smoker. Reports 9/10 pain which was mostly medial, now medial and lateral,. She has been on bedrest since admit. Past Medical History:   Diagnosis Date    Allergic rhinitis     Asthma     Depressed bipolar I disorder ([facility] [address]     Hypertension     Obsessive reaction          Past Surgical History:   Procedure Laterality Date    ENDOMETRIAL ABLATION         Medications Prior to Admission:    Medications Prior to Admission: omeprazole (PRILOSEC) 10 MG delayed release capsule, Take 10 mg by mouth daily  montelukast (SINGULAIR) 10 MG tablet, take 1 tablet by mouth once daily  valsartan (DIOVAN) 80 MG tablet, Take 1 tablet by mouth daily  albuterol (ACCUNEB) 0.63 MG/3ML nebulizer solution, Take 3 mLs by nebulization every 6 hours as needed for Wheezing  sertraline (ZOLOFT) 50 MG tablet, Take 75 mg by mouth daily Takes 1.5 tabs daily  buPROPion (WELLBUTRIN SR) 100 MG extended release tablet, Take 100 mg by mouth daily   zolpidem (AMBIEN) 10 MG tablet, Take 5 mg by mouth nightly as needed for Sleep. Charles Lima lamoTRIgine (LAMICTAL) 200 MG tablet, Take 200 mg by mouth daily   propranolol (INDERAL) 10 MG tablet, Take 1 tablet by mouth 2 times daily    Allergies:    Patient has no known allergies. Social History:    reports that she quit smoking about 12 years ago. Her smoking use included cigarettes. She has a 21.00 pack-year smoking history. She has never used smokeless tobacco. She reports previous alcohol use. She reports that she does not use drugs.     Family History:   family history includes Heart Disease in her father; No Known Problems in her mother. REVIEW OF SYSTEMS:  As above in the HPI, otherwise negative    PHYSICAL EXAM:    Vitals:  BP (!) 147/68   Pulse 64   Temp 97.9 °F (36.6 °C) (Oral)   Resp 18   Ht 5' 9\" (1.753 m)   Wt 210 lb (95.3 kg)   SpO2 99%   BMI 31.01 kg/m²     General:  Awake, alert, oriented X 3. Well developed, well nourished, well groomed. In obvious pain. HEENT:  Normocephalic, atraumatic. Pupils equal, round, reactive to light. No scleral icterus. No conjunctival injection. Normal lips, teeth, and gums. No nasal discharge. Neck:  Supple  Heart:  RRR, no murmurs, gallops, or rubs  Lungs:  CTA bilaterally, bilat symmetrical expansion, no wheeze, rales, or rhonchi  Abdomen: Bowel sounds present, soft, nontender  Extremities:  LLE appropriately splinted. All compartments are compressible. There does not appear to be excessive swelling however I cannot evaluate the soft tissue envelope through the splint. Toes are well perfused. Knee NT.    Skin:  Warm and dry, no open lesions or rash  Neuro:  Cranial nerves 2-12 intact, no focal deficits  Breast: deferred  Rectal: deferred  Genitalia:  deferred    LABS:  CBC with Differential:    Lab Results   Component Value Date    WBC 9.2 12/18/2020    RBC 4.09 12/18/2020    HGB 13.3 12/18/2020    HCT 41.7 12/18/2020     12/18/2020    .0 12/18/2020    MCH 32.5 12/18/2020    MCHC 31.9 12/18/2020    RDW 13.2 12/18/2020    LYMPHOPCT 24.8 12/18/2020    MONOPCT 10.2 12/18/2020    BASOPCT 0.8 12/18/2020    MONOSABS 0.94 12/18/2020    LYMPHSABS 2.29 12/18/2020    EOSABS 0.18 12/18/2020    BASOSABS 0.07 12/18/2020     BMP:    Lab Results   Component Value Date     12/18/2020    K 3.7 12/18/2020     12/18/2020    CO2 26 12/18/2020    BUN 8 12/18/2020    LABALBU 4.2 12/18/2020    CREATININE 0.9 12/18/2020    CALCIUM 9.4 12/18/2020    GFRAA >60 12/18/2020    LABGLOM >60 12/18/2020    GLUCOSE 102 12/18/2020     PT/INR:  No results found for: PROTIME, INR  PTT:  No results found for: APTT, PTT[APTT}      ASSESSMENT:      Patient Active Problem List   Diagnosis    Mild persistent asthma without complication    Chronic non-seasonal allergic rhinitis    Environmental allergies    Cigarette nicotine dependence in remission    Ankle dislocation, left, initial encounter    Urinary retention       PLAN:    Bimalleolar left ankle fx-dislocation  Closed reduction performed in ER w/satisfactory alignment  Admitted by Dr. Sharee Cantu for pain management and medical assesment  Will require ORIF - Dr. Pauline Crigler to perform in AM  Will make some changes in pain medication along w/better attn to ice/elevation    Ibrahima Marte MD

## 2020-12-18 NOTE — PROGRESS NOTES
Physical Therapy    Facility/Department: [address] ORTHO SURGERY    NAME: Roger Morocho  : 1970  MRN: [id number]    Date of Service: 2020    PT consult was received and eval was attempted. Will hold PT at this time and await orthopedics plan and orders. Per x-ray pt has L ankle dislocation (s/p reduction in ER) and B malleolar fxs. Will re-attempt another time. Julio Code Kit Edie., P.T.   License Number: [id number]

## 2020-12-18 NOTE — CONSULTS
12/18/2020 10:17 AM  Service: Urology  Group: [facility] ([facility])    Jessica Phillips  [id number]     Chief Complaint: AUR    History of Present Illness: The patient is a 53 y.o. female patient who presents with left ankle injury  She has not seen a urologist in the past  She was not able to void  She did have a straight cath today for 1100  She did not have a son placed  She does feel like she needs to void ok at present   She has had issues with retention in the past X3 post her pregnancy and surgery  She was voiding well prior to admission  She has not seen a urologist in the past  She has not seen any gross hematuria  She does have regular BM's    Past Medical History:   Diagnosis Date    Allergic rhinitis     Asthma     Depressed bipolar I disorder ([facility] [address]     Hypertension     Obsessive reaction        Past Surgical History:   Procedure Laterality Date    ENDOMETRIAL ABLATION         Medications Prior to Admission:    Medications Prior to Admission: omeprazole (PRILOSEC) 10 MG delayed release capsule, Take 10 mg by mouth daily  montelukast (SINGULAIR) 10 MG tablet, take 1 tablet by mouth once daily  valsartan (DIOVAN) 80 MG tablet, Take 1 tablet by mouth daily  albuterol (ACCUNEB) 0.63 MG/3ML nebulizer solution, Take 3 mLs by nebulization every 6 hours as needed for Wheezing  sertraline (ZOLOFT) 50 MG tablet, Take 75 mg by mouth daily Takes 1.5 tabs daily  buPROPion (WELLBUTRIN SR) 100 MG extended release tablet, Take 100 mg by mouth daily   zolpidem (AMBIEN) 10 MG tablet, Take 5 mg by mouth nightly as needed for Sleep. Mike Quan lamoTRIgine (LAMICTAL) 200 MG tablet, Take 200 mg by mouth daily   propranolol (INDERAL) 10 MG tablet, Take 1 tablet by mouth 2 times daily    Allergies:    Patient has no known allergies. Social History:    reports that she quit smoking about 12 years ago. Her smoking use included cigarettes. She has a 21.00 pack-year smoking history.  She has never used smokeless tobacco. She reports previous alcohol use. She reports that she does not use drugs. Family History:   Non-contributory to this urological problem  family history includes Heart Disease in her father; No Known Problems in her mother. Review of Systems:  Respiratory: negative for cough and hemoptysis  Cardiovascular: negative for chest pain and dyspnea  Gastrointestinal: negative for abdominal pain, diarrhea, nausea and vomiting  Derm: negative for rash and skin lesion(s)  Neurological: negative for seizures and tremors  Endocrine: negative for diabetic symptoms including polydipsia and polyuria  : As above in the HPI, otherwise negative  All other reviews are negative    Physical Exam:   Vitals: BP (!) 147/68   Pulse 64   Temp 97.9 °F (36.6 °C) (Oral)   Resp 18   Ht 5' 9\" (1.753 m)   Wt 210 lb (95.3 kg)   SpO2 99%   BMI 31.01 kg/m²   General:  Awake, alert, oriented X 3. Well developed, well nourished, well groomed. No apparent distress. HEENT:  Normocephalic, atraumatic. Pupils equal, round. No scleral icterus. No conjunctival injection. Normal lips, teeth, and gums. No nasal discharge. Neck:  Supple, no masses. Heart:  RRR  Lungs:  No audible wheezing. Respirations symmetric and non-labored. Abdomen:  soft, nontender, no masses, no organomegaly, no peritoneal signs  Extremities:  No clubbing, cyanosis, or edema  Skin:  Warm and dry, no open lesions or rashes  Neuro:  Cranial nerves 2-12 intact, no focal deficits  Rectal: deferred  Genitalia:  Son clear    Labs:   Recent Labs     12/18/20  0912   WBC 9.2   RBC 4.09   HGB 13.3   HCT 41.7   .0*   MCH 32.5   MCHC 31.9*   RDW 13.2      MPV 11.1       No results for input(s): CREATININE in the last 72 hours.     Images:      Assessment: Nathan Jaffe 53 y.o. female     AUR    Plan:    Place a son  Voiding trial when stable   She does have situation retention in the past  She will need outpatient eval  All options were discussed     Roxanne Mcfarland DO   [facility]  Urology

## 2020-12-19 ENCOUNTER — APPOINTMENT (OUTPATIENT)
Dept: GENERAL RADIOLOGY | Age: 50
DRG: 493 | End: 2020-12-19
Payer: COMMERCIAL

## 2020-12-19 ENCOUNTER — ANESTHESIA (OUTPATIENT)
Dept: OPERATING ROOM | Age: 50
DRG: 493 | End: 2020-12-19
Payer: COMMERCIAL

## 2020-12-19 VITALS
RESPIRATION RATE: 3 BRPM | SYSTOLIC BLOOD PRESSURE: 109 MMHG | TEMPERATURE: 99.3 F | OXYGEN SATURATION: 98 % | DIASTOLIC BLOOD PRESSURE: 59 MMHG

## 2020-12-19 PROBLEM — S82.842A ANKLE FRACTURE, BIMALLEOLAR, CLOSED, LEFT, INITIAL ENCOUNTER: Status: ACTIVE | Noted: 2020-12-19

## 2020-12-19 LAB
ALBUMIN SERPL-MCNC: 3.6 G/DL (ref 3.5–5.2)
ALP BLD-CCNC: 77 U/L (ref 35–104)
ALT SERPL-CCNC: 9 U/L (ref 0–32)
ANION GAP SERPL CALCULATED.3IONS-SCNC: 8 MMOL/L (ref 7–16)
AST SERPL-CCNC: 17 U/L (ref 0–31)
BASOPHILS ABSOLUTE: 0.06 E9/L (ref 0–0.2)
BASOPHILS RELATIVE PERCENT: 0.8 % (ref 0–2)
BILIRUB SERPL-MCNC: 0.4 MG/DL (ref 0–1.2)
BUN BLDV-MCNC: 8 MG/DL (ref 6–20)
CALCIUM SERPL-MCNC: 9 MG/DL (ref 8.6–10.2)
CHLORIDE BLD-SCNC: 102 MMOL/L (ref 98–107)
CO2: 27 MMOL/L (ref 22–29)
CREAT SERPL-MCNC: 0.9 MG/DL (ref 0.5–1)
EOSINOPHILS ABSOLUTE: 0.22 E9/L (ref 0.05–0.5)
EOSINOPHILS RELATIVE PERCENT: 2.9 % (ref 0–6)
GFR AFRICAN AMERICAN: >60
GFR NON-AFRICAN AMERICAN: >60 ML/MIN/1.73
GLUCOSE BLD-MCNC: 94 MG/DL (ref 74–99)
HCG(URINE) PREGNANCY TEST: NEGATIVE
HCT VFR BLD CALC: 38.7 % (ref 34–48)
HEMOGLOBIN: 12.2 G/DL (ref 11.5–15.5)
IMMATURE GRANULOCYTES #: 0.05 E9/L
IMMATURE GRANULOCYTES %: 0.7 % (ref 0–5)
LYMPHOCYTES ABSOLUTE: 1.8 E9/L (ref 1.5–4)
LYMPHOCYTES RELATIVE PERCENT: 23.9 % (ref 20–42)
MCH RBC QN AUTO: 32.5 PG (ref 26–35)
MCHC RBC AUTO-ENTMCNC: 31.5 % (ref 32–34.5)
MCV RBC AUTO: 103.2 FL (ref 80–99.9)
MONOCYTES ABSOLUTE: 0.58 E9/L (ref 0.1–0.95)
MONOCYTES RELATIVE PERCENT: 7.7 % (ref 2–12)
NEUTROPHILS ABSOLUTE: 4.81 E9/L (ref 1.8–7.3)
NEUTROPHILS RELATIVE PERCENT: 64 % (ref 43–80)
PDW BLD-RTO: 13.2 FL (ref 11.5–15)
PLATELET # BLD: 290 E9/L (ref 130–450)
PMV BLD AUTO: 11.6 FL (ref 7–12)
POTASSIUM REFLEX MAGNESIUM: 3.6 MMOL/L (ref 3.5–5)
RBC # BLD: 3.75 E12/L (ref 3.5–5.5)
SODIUM BLD-SCNC: 137 MMOL/L (ref 132–146)
TOTAL PROTEIN: 6.6 G/DL (ref 6.4–8.3)
WBC # BLD: 7.5 E9/L (ref 4.5–11.5)

## 2020-12-19 PROCEDURE — 6360000002 HC RX W HCPCS: Performed by: ANESTHESIOLOGY

## 2020-12-19 PROCEDURE — 7100000001 HC PACU RECOVERY - ADDTL 15 MIN: Performed by: GENERAL ACUTE CARE HOSPITAL

## 2020-12-19 PROCEDURE — 6370000000 HC RX 637 (ALT 250 FOR IP): Performed by: ORTHOPAEDIC SURGERY

## 2020-12-19 PROCEDURE — 2500000003 HC RX 250 WO HCPCS: Performed by: GENERAL ACUTE CARE HOSPITAL

## 2020-12-19 PROCEDURE — 85025 COMPLETE CBC W/AUTO DIFF WBC: CPT

## 2020-12-19 PROCEDURE — 6360000002 HC RX W HCPCS: Performed by: INTERNAL MEDICINE

## 2020-12-19 PROCEDURE — 2580000003 HC RX 258: Performed by: GENERAL ACUTE CARE HOSPITAL

## 2020-12-19 PROCEDURE — 36415 COLL VENOUS BLD VENIPUNCTURE: CPT

## 2020-12-19 PROCEDURE — 81025 URINE PREGNANCY TEST: CPT

## 2020-12-19 PROCEDURE — 2720000010 HC SURG SUPPLY STERILE: Performed by: GENERAL ACUTE CARE HOSPITAL

## 2020-12-19 PROCEDURE — 2500000003 HC RX 250 WO HCPCS: Performed by: NURSE ANESTHETIST, CERTIFIED REGISTERED

## 2020-12-19 PROCEDURE — 73560 X-RAY EXAM OF KNEE 1 OR 2: CPT

## 2020-12-19 PROCEDURE — 80053 COMPREHEN METABOLIC PANEL: CPT

## 2020-12-19 PROCEDURE — 1200000000 HC SEMI PRIVATE

## 2020-12-19 PROCEDURE — 6370000000 HC RX 637 (ALT 250 FOR IP): Performed by: GENERAL ACUTE CARE HOSPITAL

## 2020-12-19 PROCEDURE — C1769 GUIDE WIRE: HCPCS | Performed by: GENERAL ACUTE CARE HOSPITAL

## 2020-12-19 PROCEDURE — 7100000000 HC PACU RECOVERY - FIRST 15 MIN: Performed by: GENERAL ACUTE CARE HOSPITAL

## 2020-12-19 PROCEDURE — 3700000000 HC ANESTHESIA ATTENDED CARE: Performed by: GENERAL ACUTE CARE HOSPITAL

## 2020-12-19 PROCEDURE — 2580000003 HC RX 258: Performed by: INTERNAL MEDICINE

## 2020-12-19 PROCEDURE — C1713 ANCHOR/SCREW BN/BN,TIS/BN: HCPCS | Performed by: GENERAL ACUTE CARE HOSPITAL

## 2020-12-19 PROCEDURE — 87081 CULTURE SCREEN ONLY: CPT

## 2020-12-19 PROCEDURE — 3600000014 HC SURGERY LEVEL 4 ADDTL 15MIN: Performed by: GENERAL ACUTE CARE HOSPITAL

## 2020-12-19 PROCEDURE — 6360000002 HC RX W HCPCS: Performed by: GENERAL ACUTE CARE HOSPITAL

## 2020-12-19 PROCEDURE — 6360000002 HC RX W HCPCS: Performed by: NURSE ANESTHETIST, CERTIFIED REGISTERED

## 2020-12-19 PROCEDURE — 6370000000 HC RX 637 (ALT 250 FOR IP): Performed by: INTERNAL MEDICINE

## 2020-12-19 PROCEDURE — 3700000001 HC ADD 15 MINUTES (ANESTHESIA): Performed by: GENERAL ACUTE CARE HOSPITAL

## 2020-12-19 PROCEDURE — 6360000002 HC RX W HCPCS: Performed by: STUDENT IN AN ORGANIZED HEALTH CARE EDUCATION/TRAINING PROGRAM

## 2020-12-19 PROCEDURE — 2580000003 HC RX 258: Performed by: NURSE ANESTHETIST, CERTIFIED REGISTERED

## 2020-12-19 PROCEDURE — 6360000002 HC RX W HCPCS: Performed by: ORTHOPAEDIC SURGERY

## 2020-12-19 PROCEDURE — 2709999900 HC NON-CHARGEABLE SUPPLY: Performed by: GENERAL ACUTE CARE HOSPITAL

## 2020-12-19 PROCEDURE — 3209999900 FLUORO FOR SURGICAL PROCEDURES

## 2020-12-19 PROCEDURE — 0QSH04Z REPOSITION LEFT TIBIA WITH INTERNAL FIXATION DEVICE, OPEN APPROACH: ICD-10-PCS | Performed by: INTERNAL MEDICINE

## 2020-12-19 PROCEDURE — 96376 TX/PRO/DX INJ SAME DRUG ADON: CPT

## 2020-12-19 PROCEDURE — 6360000002 HC RX W HCPCS

## 2020-12-19 PROCEDURE — 3600000004 HC SURGERY LEVEL 4 BASE: Performed by: GENERAL ACUTE CARE HOSPITAL

## 2020-12-19 DEVICE — SCREW BNE L14MM DIA3.5MM CORT S STL ST NONCANNULATED LOK: Type: IMPLANTABLE DEVICE | Site: ANKLE | Status: FUNCTIONAL

## 2020-12-19 DEVICE — SCREW BNE L18MM DIA2.7MM CORT S STL ST FULL THRD FOR SM: Type: IMPLANTABLE DEVICE | Site: ANKLE | Status: FUNCTIONAL

## 2020-12-19 DEVICE — PLATE BNE L117MM 10 H S STL 1/3 TBLR LOK COMPR W/ CLLR FOR: Type: IMPLANTABLE DEVICE | Site: ANKLE | Status: FUNCTIONAL

## 2020-12-19 DEVICE — SCREW BNE L14MM DIA2.7MM CORT S STL ST FULL THRD FOR SM: Type: IMPLANTABLE DEVICE | Site: ANKLE | Status: FUNCTIONAL

## 2020-12-19 DEVICE — SCREW BNE L45MM DIA3.5MM CORT S STL ST NONCANNULATED LOK: Type: IMPLANTABLE DEVICE | Site: ANKLE | Status: FUNCTIONAL

## 2020-12-19 DEVICE — SCREW BNE L16MM DIA4MM CANC S STL ST CANN NONLOCKING FULL: Type: IMPLANTABLE DEVICE | Site: ANKLE | Status: FUNCTIONAL

## 2020-12-19 DEVICE — SCREW BNE L12MM DIA3.5MM CORT S STL ST NONCANNULATED LOK: Type: IMPLANTABLE DEVICE | Site: ANKLE | Status: FUNCTIONAL

## 2020-12-19 DEVICE — ISOLA SPINE SYSTEM ROD BENDERS (TUBULAR) SET
Type: IMPLANTABLE DEVICE | Site: ANKLE | Status: FUNCTIONAL
Brand: ISOLA

## 2020-12-19 DEVICE — SCREW BNE L36MM DIA3.5MM CORT S STL ST NONCANNULATED LOK: Type: IMPLANTABLE DEVICE | Site: ANKLE | Status: FUNCTIONAL

## 2020-12-19 DEVICE — PLATE BNE L62MM 3 H S STL LOK COMPR HK FOR 3.5MM SCR LCP: Type: IMPLANTABLE DEVICE | Site: ANKLE | Status: FUNCTIONAL

## 2020-12-19 RX ORDER — PROPOFOL 10 MG/ML
INJECTION, EMULSION INTRAVENOUS PRN
Status: DISCONTINUED | OUTPATIENT
Start: 2020-12-19 | End: 2020-12-19 | Stop reason: SDUPTHER

## 2020-12-19 RX ORDER — ROCURONIUM BROMIDE 10 MG/ML
INJECTION, SOLUTION INTRAVENOUS PRN
Status: DISCONTINUED | OUTPATIENT
Start: 2020-12-19 | End: 2020-12-19

## 2020-12-19 RX ORDER — OXYCODONE HYDROCHLORIDE 5 MG/1
10 TABLET ORAL EVERY 4 HOURS PRN
Status: DISCONTINUED | OUTPATIENT
Start: 2020-12-19 | End: 2020-12-20 | Stop reason: HOSPADM

## 2020-12-19 RX ORDER — SODIUM CHLORIDE 0.9 % (FLUSH) 0.9 %
10 SYRINGE (ML) INJECTION EVERY 12 HOURS SCHEDULED
Status: DISCONTINUED | OUTPATIENT
Start: 2020-12-19 | End: 2020-12-20 | Stop reason: HOSPADM

## 2020-12-19 RX ORDER — ROCURONIUM BROMIDE 10 MG/ML
INJECTION, SOLUTION INTRAVENOUS PRN
Status: DISCONTINUED | OUTPATIENT
Start: 2020-12-19 | End: 2020-12-19 | Stop reason: SDUPTHER

## 2020-12-19 RX ORDER — ONDANSETRON 2 MG/ML
INJECTION INTRAMUSCULAR; INTRAVENOUS PRN
Status: DISCONTINUED | OUTPATIENT
Start: 2020-12-19 | End: 2020-12-19 | Stop reason: SDUPTHER

## 2020-12-19 RX ORDER — PROMETHAZINE HYDROCHLORIDE 25 MG/ML
6.25 INJECTION, SOLUTION INTRAMUSCULAR; INTRAVENOUS ONCE
Status: COMPLETED | OUTPATIENT
Start: 2020-12-19 | End: 2020-12-19

## 2020-12-19 RX ORDER — SENNA AND DOCUSATE SODIUM 50; 8.6 MG/1; MG/1
1 TABLET, FILM COATED ORAL 2 TIMES DAILY
Status: DISCONTINUED | OUTPATIENT
Start: 2020-12-19 | End: 2020-12-20 | Stop reason: HOSPADM

## 2020-12-19 RX ORDER — MIDAZOLAM HYDROCHLORIDE 1 MG/ML
INJECTION INTRAMUSCULAR; INTRAVENOUS PRN
Status: DISCONTINUED | OUTPATIENT
Start: 2020-12-19 | End: 2020-12-19 | Stop reason: SDUPTHER

## 2020-12-19 RX ORDER — SODIUM CHLORIDE 0.9 % (FLUSH) 0.9 %
10 SYRINGE (ML) INJECTION PRN
Status: DISCONTINUED | OUTPATIENT
Start: 2020-12-19 | End: 2020-12-20 | Stop reason: HOSPADM

## 2020-12-19 RX ORDER — FENTANYL CITRATE 50 UG/ML
INJECTION, SOLUTION INTRAMUSCULAR; INTRAVENOUS PRN
Status: DISCONTINUED | OUTPATIENT
Start: 2020-12-19 | End: 2020-12-19 | Stop reason: SDUPTHER

## 2020-12-19 RX ORDER — EPHEDRINE SULFATE/0.9% NACL/PF 50 MG/5 ML
SYRINGE (ML) INTRAVENOUS PRN
Status: DISCONTINUED | OUTPATIENT
Start: 2020-12-19 | End: 2020-12-19 | Stop reason: SDUPTHER

## 2020-12-19 RX ORDER — SODIUM CHLORIDE, SODIUM LACTATE, POTASSIUM CHLORIDE, CALCIUM CHLORIDE 600; 310; 30; 20 MG/100ML; MG/100ML; MG/100ML; MG/100ML
INJECTION, SOLUTION INTRAVENOUS CONTINUOUS PRN
Status: DISCONTINUED | OUTPATIENT
Start: 2020-12-19 | End: 2020-12-19 | Stop reason: SDUPTHER

## 2020-12-19 RX ORDER — PROMETHAZINE HYDROCHLORIDE 25 MG/ML
INJECTION, SOLUTION INTRAMUSCULAR; INTRAVENOUS
Status: COMPLETED
Start: 2020-12-19 | End: 2020-12-19

## 2020-12-19 RX ORDER — LIDOCAINE HYDROCHLORIDE 20 MG/ML
INJECTION, SOLUTION EPIDURAL; INFILTRATION; INTRACAUDAL; PERINEURAL PRN
Status: DISCONTINUED | OUTPATIENT
Start: 2020-12-19 | End: 2020-12-19

## 2020-12-19 RX ORDER — OXYCODONE HYDROCHLORIDE 5 MG/1
5 TABLET ORAL EVERY 4 HOURS PRN
Status: DISCONTINUED | OUTPATIENT
Start: 2020-12-19 | End: 2020-12-19

## 2020-12-19 RX ORDER — MORPHINE SULFATE 2 MG/ML
2 INJECTION, SOLUTION INTRAMUSCULAR; INTRAVENOUS
Status: DISCONTINUED | OUTPATIENT
Start: 2020-12-19 | End: 2020-12-20 | Stop reason: HOSPADM

## 2020-12-19 RX ORDER — OXYCODONE HYDROCHLORIDE AND ACETAMINOPHEN 5; 325 MG/1; MG/1
1 TABLET ORAL EVERY 6 HOURS PRN
Qty: 30 TABLET | Refills: 0 | Status: SHIPPED | OUTPATIENT
Start: 2020-12-19 | End: 2020-12-26

## 2020-12-19 RX ORDER — LIDOCAINE HYDROCHLORIDE 20 MG/ML
INJECTION, SOLUTION EPIDURAL; INFILTRATION; INTRACAUDAL; PERINEURAL PRN
Status: DISCONTINUED | OUTPATIENT
Start: 2020-12-19 | End: 2020-12-19 | Stop reason: SDUPTHER

## 2020-12-19 RX ORDER — FENTANYL CITRATE 50 UG/ML
25 INJECTION, SOLUTION INTRAMUSCULAR; INTRAVENOUS EVERY 5 MIN PRN
Status: DISCONTINUED | OUTPATIENT
Start: 2020-12-19 | End: 2020-12-19 | Stop reason: HOSPADM

## 2020-12-19 RX ADMIN — FENTANYL CITRATE 50 MCG: 50 INJECTION, SOLUTION INTRAMUSCULAR; INTRAVENOUS at 11:49

## 2020-12-19 RX ADMIN — LORAZEPAM 0.5 MG: 2 INJECTION INTRAMUSCULAR; INTRAVENOUS at 01:21

## 2020-12-19 RX ADMIN — SODIUM CHLORIDE, PRESERVATIVE FREE 10 ML: 5 INJECTION INTRAVENOUS at 08:06

## 2020-12-19 RX ADMIN — HYDROMORPHONE HYDROCHLORIDE 1 MG: 1 INJECTION, SOLUTION INTRAMUSCULAR; INTRAVENOUS; SUBCUTANEOUS at 01:36

## 2020-12-19 RX ADMIN — SODIUM CHLORIDE, POTASSIUM CHLORIDE, SODIUM LACTATE AND CALCIUM CHLORIDE: 600; 310; 30; 20 INJECTION, SOLUTION INTRAVENOUS at 08:40

## 2020-12-19 RX ADMIN — PROMETHAZINE HYDROCHLORIDE 6.25 MG: 25 INJECTION, SOLUTION INTRAMUSCULAR; INTRAVENOUS at 13:58

## 2020-12-19 RX ADMIN — FENTANYL CITRATE 25 MCG: 50 INJECTION, SOLUTION INTRAMUSCULAR; INTRAVENOUS at 14:07

## 2020-12-19 RX ADMIN — Medication 25 MG: at 10:40

## 2020-12-19 RX ADMIN — Medication 2 G: at 09:22

## 2020-12-19 RX ADMIN — MONTELUKAST SODIUM 10 MG: 10 TABLET, FILM COATED ORAL at 20:31

## 2020-12-19 RX ADMIN — FENTANYL CITRATE 25 MCG: 50 INJECTION, SOLUTION INTRAMUSCULAR; INTRAVENOUS at 14:02

## 2020-12-19 RX ADMIN — LIDOCAINE HYDROCHLORIDE 50 MG: 20 INJECTION, SOLUTION EPIDURAL; INFILTRATION; INTRACAUDAL; PERINEURAL at 09:39

## 2020-12-19 RX ADMIN — FENTANYL CITRATE 50 MCG: 50 INJECTION, SOLUTION INTRAMUSCULAR; INTRAVENOUS at 12:47

## 2020-12-19 RX ADMIN — PROMETHAZINE HYDROCHLORIDE 6.25 MG: 25 INJECTION INTRAMUSCULAR; INTRAVENOUS at 13:58

## 2020-12-19 RX ADMIN — MIDAZOLAM 2 MG: 1 INJECTION INTRAMUSCULAR; INTRAVENOUS at 09:20

## 2020-12-19 RX ADMIN — MORPHINE SULFATE 2 MG: 2 INJECTION, SOLUTION INTRAMUSCULAR; INTRAVENOUS at 20:31

## 2020-12-19 RX ADMIN — FENTANYL CITRATE 25 MCG: 50 INJECTION, SOLUTION INTRAMUSCULAR; INTRAVENOUS at 14:39

## 2020-12-19 RX ADMIN — Medication 2 G: at 13:11

## 2020-12-19 RX ADMIN — PROPOFOL 150 MG: 10 INJECTION, EMULSION INTRAVENOUS at 09:39

## 2020-12-19 RX ADMIN — FENTANYL CITRATE 50 MCG: 50 INJECTION, SOLUTION INTRAMUSCULAR; INTRAVENOUS at 12:27

## 2020-12-19 RX ADMIN — Medication 10 ML: at 01:35

## 2020-12-19 RX ADMIN — SODIUM CHLORIDE, PRESERVATIVE FREE 10 ML: 5 INJECTION INTRAVENOUS at 20:31

## 2020-12-19 RX ADMIN — ROCURONIUM BROMIDE 40 MG: 10 INJECTION, SOLUTION INTRAVENOUS at 09:39

## 2020-12-19 RX ADMIN — ONDANSETRON 4 MG: 2 INJECTION INTRAMUSCULAR; INTRAVENOUS at 10:00

## 2020-12-19 RX ADMIN — DOCUSATE SODIUM 50 MG AND SENNOSIDES 8.6 MG 1 TABLET: 8.6; 5 TABLET, FILM COATED ORAL at 20:31

## 2020-12-19 RX ADMIN — PROPOFOL 150 MG: 10 INJECTION, EMULSION INTRAVENOUS at 10:00

## 2020-12-19 RX ADMIN — LORAZEPAM 0.5 MG: 2 INJECTION INTRAMUSCULAR; INTRAVENOUS at 19:22

## 2020-12-19 RX ADMIN — HYDROMORPHONE HYDROCHLORIDE 1 MG: 1 INJECTION, SOLUTION INTRAMUSCULAR; INTRAVENOUS; SUBCUTANEOUS at 04:36

## 2020-12-19 RX ADMIN — FENTANYL CITRATE 100 MCG: 50 INJECTION, SOLUTION INTRAMUSCULAR; INTRAVENOUS at 09:33

## 2020-12-19 RX ADMIN — SODIUM CHLORIDE, PRESERVATIVE FREE 10 ML: 5 INJECTION INTRAVENOUS at 19:22

## 2020-12-19 RX ADMIN — HYDROMORPHONE HYDROCHLORIDE 1 MG: 1 INJECTION, SOLUTION INTRAMUSCULAR; INTRAVENOUS; SUBCUTANEOUS at 08:04

## 2020-12-19 RX ADMIN — ENOXAPARIN SODIUM 30 MG: 30 INJECTION SUBCUTANEOUS at 23:23

## 2020-12-19 RX ADMIN — OXYCODONE HYDROCHLORIDE 10 MG: 5 TABLET ORAL at 23:22

## 2020-12-19 RX ADMIN — OXYCODONE HYDROCHLORIDE 5 MG: 5 TABLET ORAL at 22:16

## 2020-12-19 ASSESSMENT — PULMONARY FUNCTION TESTS
PIF_VALUE: 0
PIF_VALUE: 19
PIF_VALUE: 22
PIF_VALUE: 22
PIF_VALUE: 18
PIF_VALUE: 19
PIF_VALUE: 18
PIF_VALUE: 22
PIF_VALUE: 18
PIF_VALUE: 14
PIF_VALUE: 18
PIF_VALUE: 19
PIF_VALUE: 3
PIF_VALUE: 1
PIF_VALUE: 18
PIF_VALUE: 22
PIF_VALUE: 19
PIF_VALUE: 18
PIF_VALUE: 3
PIF_VALUE: 22
PIF_VALUE: 18
PIF_VALUE: 3
PIF_VALUE: 3
PIF_VALUE: 17
PIF_VALUE: 3
PIF_VALUE: 17
PIF_VALUE: 18
PIF_VALUE: 19
PIF_VALUE: 18
PIF_VALUE: 18
PIF_VALUE: 19
PIF_VALUE: 18
PIF_VALUE: 24
PIF_VALUE: 18
PIF_VALUE: 32
PIF_VALUE: 18
PIF_VALUE: 17
PIF_VALUE: 18
PIF_VALUE: 18
PIF_VALUE: 17
PIF_VALUE: 18
PIF_VALUE: 19
PIF_VALUE: 22
PIF_VALUE: 18
PIF_VALUE: 6
PIF_VALUE: 24
PIF_VALUE: 18
PIF_VALUE: 3
PIF_VALUE: 19
PIF_VALUE: 20
PIF_VALUE: 19
PIF_VALUE: 3
PIF_VALUE: 3
PIF_VALUE: 19
PIF_VALUE: 18
PIF_VALUE: 23
PIF_VALUE: 19
PIF_VALUE: 25
PIF_VALUE: 18
PIF_VALUE: 19
PIF_VALUE: 17
PIF_VALUE: 3
PIF_VALUE: 24
PIF_VALUE: 4
PIF_VALUE: 19
PIF_VALUE: 24
PIF_VALUE: 19
PIF_VALUE: 19
PIF_VALUE: 17
PIF_VALUE: 17
PIF_VALUE: 18
PIF_VALUE: 18
PIF_VALUE: 17
PIF_VALUE: 18
PIF_VALUE: 23
PIF_VALUE: 18
PIF_VALUE: 19
PIF_VALUE: 18
PIF_VALUE: 23
PIF_VALUE: 3
PIF_VALUE: 19
PIF_VALUE: 6
PIF_VALUE: 24
PIF_VALUE: 3
PIF_VALUE: 18
PIF_VALUE: 22
PIF_VALUE: 17
PIF_VALUE: 22
PIF_VALUE: 18
PIF_VALUE: 17
PIF_VALUE: 3
PIF_VALUE: 18
PIF_VALUE: 3
PIF_VALUE: 19
PIF_VALUE: 17
PIF_VALUE: 4
PIF_VALUE: 19
PIF_VALUE: 23
PIF_VALUE: 18
PIF_VALUE: 17
PIF_VALUE: 19
PIF_VALUE: 18
PIF_VALUE: 17
PIF_VALUE: 18
PIF_VALUE: 3
PIF_VALUE: 20
PIF_VALUE: 3
PIF_VALUE: 19
PIF_VALUE: 18
PIF_VALUE: 3
PIF_VALUE: 19
PIF_VALUE: 1
PIF_VALUE: 3
PIF_VALUE: 18
PIF_VALUE: 19
PIF_VALUE: 18
PIF_VALUE: 19
PIF_VALUE: 18
PIF_VALUE: 3
PIF_VALUE: 19
PIF_VALUE: 3
PIF_VALUE: 17
PIF_VALUE: 22
PIF_VALUE: 18
PIF_VALUE: 19
PIF_VALUE: 23
PIF_VALUE: 17
PIF_VALUE: 1
PIF_VALUE: 20
PIF_VALUE: 3
PIF_VALUE: 3
PIF_VALUE: 19
PIF_VALUE: 3
PIF_VALUE: 19
PIF_VALUE: 18
PIF_VALUE: 3
PIF_VALUE: 17
PIF_VALUE: 1
PIF_VALUE: 18
PIF_VALUE: 19
PIF_VALUE: 18
PIF_VALUE: 18
PIF_VALUE: 22
PIF_VALUE: 3
PIF_VALUE: 18
PIF_VALUE: 19
PIF_VALUE: 2
PIF_VALUE: 19
PIF_VALUE: 3
PIF_VALUE: 24
PIF_VALUE: 18
PIF_VALUE: 19
PIF_VALUE: 23
PIF_VALUE: 18
PIF_VALUE: 19
PIF_VALUE: 3
PIF_VALUE: 17
PIF_VALUE: 17
PIF_VALUE: 19
PIF_VALUE: 19
PIF_VALUE: 15
PIF_VALUE: 22
PIF_VALUE: 24
PIF_VALUE: 1
PIF_VALUE: 5
PIF_VALUE: 19
PIF_VALUE: 19
PIF_VALUE: 22
PIF_VALUE: 18
PIF_VALUE: 19
PIF_VALUE: 3
PIF_VALUE: 3
PIF_VALUE: 19
PIF_VALUE: 19
PIF_VALUE: 8
PIF_VALUE: 24
PIF_VALUE: 17
PIF_VALUE: 19
PIF_VALUE: 23
PIF_VALUE: 17
PIF_VALUE: 20
PIF_VALUE: 19
PIF_VALUE: 18
PIF_VALUE: 18
PIF_VALUE: 24
PIF_VALUE: 24
PIF_VALUE: 18
PIF_VALUE: 3
PIF_VALUE: 19
PIF_VALUE: 18
PIF_VALUE: 23
PIF_VALUE: 19
PIF_VALUE: 18
PIF_VALUE: 19
PIF_VALUE: 4
PIF_VALUE: 24
PIF_VALUE: 19
PIF_VALUE: 19
PIF_VALUE: 22
PIF_VALUE: 17
PIF_VALUE: 17
PIF_VALUE: 18
PIF_VALUE: 0
PIF_VALUE: 1
PIF_VALUE: 17
PIF_VALUE: 17
PIF_VALUE: 23
PIF_VALUE: 3
PIF_VALUE: 20
PIF_VALUE: 3
PIF_VALUE: 19
PIF_VALUE: 18
PIF_VALUE: 19
PIF_VALUE: 17
PIF_VALUE: 17
PIF_VALUE: 22
PIF_VALUE: 18
PIF_VALUE: 1
PIF_VALUE: 22
PIF_VALUE: 17
PIF_VALUE: 0
PIF_VALUE: 3
PIF_VALUE: 15
PIF_VALUE: 1
PIF_VALUE: 1
PIF_VALUE: 19
PIF_VALUE: 23
PIF_VALUE: 18
PIF_VALUE: 37
PIF_VALUE: 17
PIF_VALUE: 20
PIF_VALUE: 18
PIF_VALUE: 19
PIF_VALUE: 23
PIF_VALUE: 20
PIF_VALUE: 18
PIF_VALUE: 3
PIF_VALUE: 19
PIF_VALUE: 19
PIF_VALUE: 24
PIF_VALUE: 19

## 2020-12-19 ASSESSMENT — PAIN DESCRIPTION - PAIN TYPE
TYPE: SURGICAL PAIN
TYPE: SURGICAL PAIN
TYPE: ACUTE PAIN
TYPE: SURGICAL PAIN

## 2020-12-19 ASSESSMENT — PAIN DESCRIPTION - LOCATION
LOCATION: ANKLE
LOCATION: BACK
LOCATION: ANKLE
LOCATION: BACK
LOCATION: ANKLE

## 2020-12-19 ASSESSMENT — PAIN SCALES - GENERAL
PAINLEVEL_OUTOF10: 0
PAINLEVEL_OUTOF10: 7
PAINLEVEL_OUTOF10: 9
PAINLEVEL_OUTOF10: 7
PAINLEVEL_OUTOF10: 5
PAINLEVEL_OUTOF10: 2
PAINLEVEL_OUTOF10: 7
PAINLEVEL_OUTOF10: 8
PAINLEVEL_OUTOF10: 4
PAINLEVEL_OUTOF10: 6
PAINLEVEL_OUTOF10: 7
PAINLEVEL_OUTOF10: 10
PAINLEVEL_OUTOF10: 6
PAINLEVEL_OUTOF10: 2
PAINLEVEL_OUTOF10: 1
PAINLEVEL_OUTOF10: 0
PAINLEVEL_OUTOF10: 5
PAINLEVEL_OUTOF10: 5

## 2020-12-19 ASSESSMENT — PAIN DESCRIPTION - ONSET
ONSET: ON-GOING

## 2020-12-19 ASSESSMENT — ENCOUNTER SYMPTOMS
COUGH: 0
DIARRHEA: 0
VOMITING: 0
SHORTNESS OF BREATH: 0
NAUSEA: 0

## 2020-12-19 ASSESSMENT — PAIN DESCRIPTION - FREQUENCY
FREQUENCY: INTERMITTENT
FREQUENCY: CONTINUOUS

## 2020-12-19 ASSESSMENT — PAIN DESCRIPTION - ORIENTATION
ORIENTATION: LOWER
ORIENTATION: LOWER
ORIENTATION: LEFT

## 2020-12-19 ASSESSMENT — PAIN - FUNCTIONAL ASSESSMENT
PAIN_FUNCTIONAL_ASSESSMENT: PREVENTS OR INTERFERES SOME ACTIVE ACTIVITIES AND ADLS
PAIN_FUNCTIONAL_ASSESSMENT: PREVENTS OR INTERFERES SOME ACTIVE ACTIVITIES AND ADLS
PAIN_FUNCTIONAL_ASSESSMENT: ACTIVITIES ARE NOT PREVENTED
PAIN_FUNCTIONAL_ASSESSMENT: ACTIVITIES ARE NOT PREVENTED
PAIN_FUNCTIONAL_ASSESSMENT: PREVENTS OR INTERFERES WITH MANY ACTIVE NOT PASSIVE ACTIVITIES
PAIN_FUNCTIONAL_ASSESSMENT: PREVENTS OR INTERFERES SOME ACTIVE ACTIVITIES AND ADLS

## 2020-12-19 ASSESSMENT — PAIN DESCRIPTION - DESCRIPTORS
DESCRIPTORS: ACHING;DISCOMFORT;CONSTANT
DESCRIPTORS: DISCOMFORT;ACHING;SORE
DESCRIPTORS: ACHING;DISCOMFORT;SORE
DESCRIPTORS: CONSTANT;SORE;TIGHTNESS
DESCRIPTORS: ACHING;DISCOMFORT
DESCRIPTORS: DISCOMFORT;ACHING;TIGHTNESS

## 2020-12-19 ASSESSMENT — PAIN DESCRIPTION - DIRECTION: RADIATING_TOWARDS: UP LEG

## 2020-12-19 ASSESSMENT — PAIN DESCRIPTION - PROGRESSION
CLINICAL_PROGRESSION: GRADUALLY WORSENING
CLINICAL_PROGRESSION: RAPIDLY IMPROVING
CLINICAL_PROGRESSION: RAPIDLY IMPROVING
CLINICAL_PROGRESSION: GRADUALLY WORSENING
CLINICAL_PROGRESSION: GRADUALLY WORSENING

## 2020-12-19 NOTE — PROGRESS NOTES
Nursing Transfer Note    Data:  Summary of patients progress: S/P left ankle ORIF  Reason for transfer: inpatient     Action:  Explained reason for transfer to Patient. Report given to: 7th floor RN, using RN Handoff Navigator. Mode of transportation: cart    Response:  RN Recommendations: see orders/notes/MAR.

## 2020-12-19 NOTE — PROGRESS NOTES
Weaning trial done at bedside and with no oxygen patients SPO2 dropped to 85%.  Will keep patient on the 2 liters and reassesses later

## 2020-12-19 NOTE — PROGRESS NOTES
tenderness. There is no rebound tenderness. There is no guarding. Extremities: There is no dependent edema. (Left lower extremity bandaged. Toes normal capillary refill, warm to touch. )  Skin:  Warm and dry. Labs/Imaging/Diagnostics    Labs:  CBC:  Recent Labs     12/18/20  0912 12/19/20  0435   WBC 9.2 7.5   RBC 4.09 3.75   HGB 13.3 12.2   HCT 41.7 38.7   .0* 103.2*   RDW 13.2 13.2    290     CHEMISTRIES:  Recent Labs     12/18/20  0912 12/19/20  0435    137   K 3.7 3.6    102   CO2 26 27   BUN 8 8   CREATININE 0.9 0.9   GLUCOSE 102* 94     PT/INR:No results for input(s): PROTIME, INR in the last 72 hours. APTT:No results for input(s): APTT in the last 72 hours. LIVER PROFILE:  Recent Labs     12/18/20  0912 12/19/20  0435   AST 20 17   ALT 12 9   BILITOT 0.6 0.4   ALKPHOS 89 77       Imaging Last 24 Hours:  Xr Ankle Left (2 Views)    Result Date: 12/17/2020  EXAMINATION: 2 XRAY VIEWS OF THE LEFT ANKLE 12/17/2020 3:56 pm COMPARISON: None. HISTORY: ORDERING SYSTEM PROVIDED HISTORY: fall ankle pain TECHNOLOGIST PROVIDED HISTORY: Reason for exam:->fall ankle pain FINDINGS: Anterolateral dislocation of the talus. Displaced medial malleolar and lateral malleolar fractures. Anterolateral dislocation of the talus Displaced medial and lateral malleolar fracture    Xr Ankle Left (min 3 Views)    Result Date: 12/17/2020  EXAMINATION: THREE XRAY VIEWS OF THE LEFT ANKLE 12/17/2020 6:13 pm COMPARISON: Left ankle radiographs from earlier today, 15:44 hours HISTORY: ORDERING SYSTEM PROVIDED HISTORY: post reduction TECHNOLOGIST PROVIDED HISTORY: Reason for exam:->post reduction FINDINGS: In the interim since the radiographs from earlier today, a cast has been placed around the left ankle. The degree of lateral displacement of the ankle joint has been significantly reduced. Again noted are mildly displaced fractures of the medial malleolus and distal fibula.     Postreduction radiographs, with improvement with respect to the degree of ankle dislocation. Assessment//Plan           Hospital Problems           Last Modified POA    * (Principal) Ankle dislocation, left, initial encounter 12/18/2020 Yes    Urinary retention 12/18/2020 Yes    Mild persistent asthma without complication 28/92/0858 Yes    Chronic non-seasonal allergic rhinitis 12/18/2020 Yes    Environmental allergies 12/18/2020 Yes    Cigarette nicotine dependence in remission 12/18/2020 Yes        Assessment & Plan  1. Left Bimalleolar fracture  2. Acute Urinary retentions secondary to opiates  3. Mild persistent asthma  4. Allergic rhinitis    Continue on pain control per orthopedics. For OR later today. DVT prophylaxis and weight bearing per ortho. Continue with son. Will try voiding trial when more mobile and off pain meds.      Sharri Culp DO    Electronically signed by Sharri Culp DO on 12/19/20 at 6:14 AM EST

## 2020-12-19 NOTE — CONSULTS
cigarettes. She has a 21.00 pack-year smoking history. She has never used smokeless tobacco. She reports previous alcohol use. She reports that she does not use drugs. Family History:   family history includes Heart Disease in her father; No Known Problems in her mother. REVIEW OF SYSTEMS:  As above in the HPI, otherwise negative    PHYSICAL EXAM:    Vitals:  BP (!) 144/60   Pulse 88   Temp 99.1 °F (37.3 °C) (Oral)   Resp 16   Ht 5' 9\" (1.753 m)   Wt 210 lb (95.3 kg)   SpO2 98%   BMI 31.01 kg/m²     General:  Awake, alert, oriented X 3. Well developed, well nourished, well groomed. No apparent distress. HEENT:  Normocephalic, atraumatic. Pupils equal, round, reactive to light. No scleral icterus. No conjunctival injection. Normal lips, teeth, and gums. No nasal discharge. Neck:  Supple  Heart:  RRR, no murmurs, gallops, or rubs  Lungs:  CTA bilaterally, bilat symmetrical expansion, no wheeze, rales, or rhonchi  Abdomen: Bowel sounds present, soft, nontender, no masses, no organomegaly, no peritoneal signs  Extremities: Left lower extremity is in a well-padded splint for mobilization at the ankle. She is able to wiggle her toes without difficulty. Sensation is intact to light touch about the left ankle and foot. Brisk capillary refill is noted of all 5 digits.   Skin:  Warm and dry, no open lesions or rash  Neuro:  Cranial nerves 2-12 intact, no focal deficits  Breast: deferred  Rectal: deferred  Genitalia:  deferred    LABS:  CBC:   Lab Results   Component Value Date    WBC 7.5 12/19/2020    RBC 3.75 12/19/2020    HGB 12.2 12/19/2020    HCT 38.7 12/19/2020    .2 12/19/2020    MCH 32.5 12/19/2020    MCHC 31.5 12/19/2020    RDW 13.2 12/19/2020     12/19/2020    MPV 11.6 12/19/2020     BMP:    Lab Results   Component Value Date     12/19/2020    K 3.6 12/19/2020     12/19/2020    CO2 27 12/19/2020    BUN 8 12/19/2020    LABALBU 3.6 12/19/2020    CREATININE 0.9 12/19/2020    CALCIUM 9.0 12/19/2020    GFRAA >60 12/19/2020    LABGLOM >60 12/19/2020    GLUCOSE 94 12/19/2020         ASSESSMENT:      Patient Active Problem List   Diagnosis    Mild persistent asthma without complication    Chronic non-seasonal allergic rhinitis    Environmental allergies    Cigarette nicotine dependence in remission    Ankle dislocation, left, initial encounter    Urinary retention       PLAN:    Patient has a bimalleolar fracture dislocation of the left ankle. Because of the fracture pattern as well as displacement, I am recommending open reduction internal fixation of the left ankle. We discussed the surgery at length this morning. All risks, benefits and alternatives treatment were reviewed. Informed consent was obtained at the bedside. She will be immobilized into a well-padded splint and kept nonweightbearing following surgery.   Plan will be to follow-up in my office in 10 to 14 days for suture removal.

## 2020-12-19 NOTE — OP NOTE
Operative Note      Patient: Roger Morocho  YOB: 1970  MRN: [id number]    Date of Procedure: 12/19/2020    Pre-Op Diagnosis: ANKLE FRACTURE    Post-Op Diagnosis: Same       Procedure(s):  LEFT ANKLE OPEN REDUCTION INTERNAL FIXATION (SYNTHES)    Surgeon(s):  Elizabeth Parker DO    Assistant:   Surgical Assistant: Noam Feng    Anesthesia: General    Estimated Blood Loss (mL): less than 417    Complications: None    Specimens:   * No specimens in log *    Implants:  Implant Name Type Inv.  Item Serial No.  Lot No. LRB No. Used Action   SCREW BNE L14MM DIA2.7MM WILBERTO S STL ST FULL THRD FOR SM  SCREW BNE L14MM DIA2.7MM WILBERTO S STL ST FULL THRD FOR SM  [employer]-WD  Left 1 Implanted   SCREW BNE L18MM DIA2.7MM WILBERTO S STL ST FULL THRD FOR SM  SCREW BNE L18MM DIA2.7MM WILBERTO S STL ST FULL THRD FOR SM  [employer]-WD  Left 1 Implanted   PLATE BNE [id number] 10 H S STL 1/3 TBLR VINNIE COMPR W/ CLLR FOR  PLATE BNE [id number] 10 H S STL 1/3 TBLR VINNIE COMPR W/ CLLR FOR  DEPUY [employer]-WD  Left 1 Implanted   SCREW BNE L16MM DIA4MM CANC S STL ST ANGELA NONLOCKING FULL  SCREW BNE L16MM DIA4MM CANC S STL ST ANGELA NONLOCKING FULL  DEPUY [employer]-WD  Left 2 Implanted   SCREW BNE L20MM DIA4MM CANC S STL ST ANGELA NONLOCKING FULL  SCREW BNE L20MM DIA4MM CANC S STL ST ANGELA NONLOCKING FULL  DEPUY [employer]WD  Left 1 Implanted   SCREW BNE L12MM DIA3.5MM WILBERTO S STL ST NONCANNULATED VINNIE  SCREW BNE L12MM DIA3.5MM WILBERTO S STL ST NONCANNULATED VINNIE  [employer]WD  Left 2 Implanted   SCREW BNE L14MM DIA3.5MM WILBERTO S STL ST NONCANNULATED VINNIE  SCREW BNE L14MM DIA3.5MM WILBERTO S STL ST NONCANNULATED VINNIE  [employer]-WD  Left 1 Implanted   SCREW BNE L50MM DIA4MM S STL ANGELA SHT 1/3 THRD SM HEX SOCK  SCREW BNE L50MM DIA4MM S STL ANGELA SHT 1/3 THRD SM HEX SOCK  [employer]-WD  Left 2 Implanted   PLATE BNE [id number] 3 H S STL VINNIE COMPR HK FOR 3.5MM SCR LCP  PLATE BNE [id number] 3 H S STL VINNIE COMPR HK FOR 3.5MM SCR LCP towards the medial malleolar fracture. A standard curved incision was applied over the anterior third of the medial malleolus. The incision was carried through the subcutaneous tissues to the level of the deltoid ligament. The deltoid was noted to be split in a transverse type fashion and entrapped within the transverse fracture fragment. Using a scalpel blade as well as a freer, I was able to alleviate the deltoid ligament from the fracture site. The distal tip of the medial malleolus was then clamped into anatomic position about the tibia using a sharp reduction clamp.  2 longitudinal K wires were then passed perpendicular to the fracture site. I drilled unicortical he using a cannulated drill bit over each of the K wires and then inserted a 50 mm 4 oh cannulated screw over each of the K wires. However, once the K wires were removed, this was insufficient to hold the medial malleolar fracture into place. There was still rotational instability of the fragment as well as gapping. The cannulated screws were then removed and I felt that the fracture could be better reduced using a hook plate. A K wire was used to perforate the distalmost tip of the medial malleolus or fragment. I reduced the fracture and then passed the K wire into the tibial shaft. Maintain reduction, a hook plate was then positioned so that the tines of the plate rested against the distal aspect of the fracture. I drilled bicortically through the oblong hole in the middle aspect of the plate. A depth gauge was used to determine the appropriate cortical screw length, and the screw was inserted into the plate. Using a bone tamp, I applied axial compression through the distal aspect of the plate in order to further reduce the fracture.   As this was held into position, I drilled the most proximal hole within the plate and inserted appropriately length then the locking screw to hold this position and allow the fracture to maintain reduction. Finally, a K wire was passed through the distalmost aspect of the plate. Using a cannulated drill bit, I drilled unicortical he and then passed one of the 50 mm 4.0 cannulated screws which have been used previously up through the plate in order to provide further axial compression. Final imaging was then obtained. The K wire was removed and anatomical reduction was confirmed at both fracture fragments. Finally, I performed an external rotation stress examination. There was no gapping of the medial clear space and the syndesmosis was appropriately reduced. Both wounds were then copiously irrigated using normal saline. The deltoid ligament was repaired at the site of injury. I had also split the deltoid ligament as it ran proximally up the tibial shaft in order to rest the hook plate against the medial surface of the tibia. I was able to repair the deltoid ligament in a side-to-side fashion so that it covered the plate appropriately. 0 Vicryl suture was also used to repair the periosteum laterally over the lateral plate. Both skin incisions were then closed in a typical layered fashion using 2-0 Vicryl and 3-0 nylon suture. Approximately 30 cc of half percent Marcaine mixed with epinephrine as well as 1% lidocaine were injected into the subcutaneous tissues. A sterile dressing was placed and the left ankle was immobilized into a well-padded AO type splint. Anesthesia was reversed and patient was then taken to the recovery unit in stable condition. There were no apparent complications. Disposition: Patient will be returned to the medical floor where she was admitted due to urinary continence as well as for pain control. She will be kept nonweightbearing on the left lower extremity. I do plan to obtain x-rays of the left knee as during her positioning, there is noted to be excessive hyperextension about the left knee.   I would like to rule out any underlying fracture that was possibly hidden at this time of her injury by the distracting injury about her ankle. Patient will follow up with me in the normal postoperative window.     Electronically signed by Nando Pulido DO on 12/19/2020 at 1:56 PM

## 2020-12-20 VITALS
RESPIRATION RATE: 16 BRPM | TEMPERATURE: 98.4 F | OXYGEN SATURATION: 98 % | WEIGHT: 223.3 LBS | BODY MASS INDEX: 33.07 KG/M2 | HEIGHT: 69 IN | DIASTOLIC BLOOD PRESSURE: 54 MMHG | HEART RATE: 95 BPM | SYSTOLIC BLOOD PRESSURE: 106 MMHG

## 2020-12-20 PROBLEM — S93.05XA ANKLE DISLOCATION, LEFT, INITIAL ENCOUNTER: Status: RESOLVED | Noted: 2020-12-17 | Resolved: 2020-12-20

## 2020-12-20 PROBLEM — S82.842A ANKLE FRACTURE, BIMALLEOLAR, CLOSED, LEFT, INITIAL ENCOUNTER: Status: RESOLVED | Noted: 2020-12-19 | Resolved: 2020-12-20

## 2020-12-20 PROBLEM — R33.9 URINARY RETENTION: Status: RESOLVED | Noted: 2020-12-18 | Resolved: 2020-12-20

## 2020-12-20 LAB
ALBUMIN SERPL-MCNC: 3.3 G/DL (ref 3.5–5.2)
ALP BLD-CCNC: 68 U/L (ref 35–104)
ALT SERPL-CCNC: 10 U/L (ref 0–32)
ANION GAP SERPL CALCULATED.3IONS-SCNC: 7 MMOL/L (ref 7–16)
AST SERPL-CCNC: 23 U/L (ref 0–31)
BASOPHILS ABSOLUTE: 0.04 E9/L (ref 0–0.2)
BASOPHILS RELATIVE PERCENT: 0.5 % (ref 0–2)
BILIRUB SERPL-MCNC: 0.3 MG/DL (ref 0–1.2)
BUN BLDV-MCNC: 3 MG/DL (ref 6–20)
CALCIUM SERPL-MCNC: 8.5 MG/DL (ref 8.6–10.2)
CHLORIDE BLD-SCNC: 102 MMOL/L (ref 98–107)
CO2: 26 MMOL/L (ref 22–29)
CREAT SERPL-MCNC: 0.7 MG/DL (ref 0.5–1)
EOSINOPHILS ABSOLUTE: 0.06 E9/L (ref 0.05–0.5)
EOSINOPHILS RELATIVE PERCENT: 0.7 % (ref 0–6)
GFR AFRICAN AMERICAN: >60
GFR NON-AFRICAN AMERICAN: >60 ML/MIN/1.73
GLUCOSE BLD-MCNC: 138 MG/DL (ref 74–99)
HCT VFR BLD CALC: 35.3 % (ref 34–48)
HEMOGLOBIN: 11.3 G/DL (ref 11.5–15.5)
IMMATURE GRANULOCYTES #: 0.05 E9/L
IMMATURE GRANULOCYTES %: 0.6 % (ref 0–5)
LYMPHOCYTES ABSOLUTE: 1.08 E9/L (ref 1.5–4)
LYMPHOCYTES RELATIVE PERCENT: 12.6 % (ref 20–42)
MCH RBC QN AUTO: 32.5 PG (ref 26–35)
MCHC RBC AUTO-ENTMCNC: 32 % (ref 32–34.5)
MCV RBC AUTO: 101.4 FL (ref 80–99.9)
MONOCYTES ABSOLUTE: 0.92 E9/L (ref 0.1–0.95)
MONOCYTES RELATIVE PERCENT: 10.7 % (ref 2–12)
MRSA CULTURE ONLY: NORMAL
NEUTROPHILS ABSOLUTE: 6.44 E9/L (ref 1.8–7.3)
NEUTROPHILS RELATIVE PERCENT: 74.9 % (ref 43–80)
PDW BLD-RTO: 13.3 FL (ref 11.5–15)
PLATELET # BLD: 262 E9/L (ref 130–450)
PMV BLD AUTO: 11.5 FL (ref 7–12)
POTASSIUM REFLEX MAGNESIUM: 3.7 MMOL/L (ref 3.5–5)
RBC # BLD: 3.48 E12/L (ref 3.5–5.5)
SODIUM BLD-SCNC: 135 MMOL/L (ref 132–146)
TOTAL PROTEIN: 6.1 G/DL (ref 6.4–8.3)
WBC # BLD: 8.6 E9/L (ref 4.5–11.5)

## 2020-12-20 PROCEDURE — 6370000000 HC RX 637 (ALT 250 FOR IP): Performed by: ORTHOPAEDIC SURGERY

## 2020-12-20 PROCEDURE — 6370000000 HC RX 637 (ALT 250 FOR IP): Performed by: INTERNAL MEDICINE

## 2020-12-20 PROCEDURE — 6360000002 HC RX W HCPCS: Performed by: STUDENT IN AN ORGANIZED HEALTH CARE EDUCATION/TRAINING PROGRAM

## 2020-12-20 PROCEDURE — 80053 COMPREHEN METABOLIC PANEL: CPT

## 2020-12-20 PROCEDURE — 85025 COMPLETE CBC W/AUTO DIFF WBC: CPT

## 2020-12-20 PROCEDURE — 36415 COLL VENOUS BLD VENIPUNCTURE: CPT

## 2020-12-20 PROCEDURE — 6370000000 HC RX 637 (ALT 250 FOR IP): Performed by: GENERAL ACUTE CARE HOSPITAL

## 2020-12-20 PROCEDURE — 2580000003 HC RX 258: Performed by: INTERNAL MEDICINE

## 2020-12-20 PROCEDURE — 2580000003 HC RX 258: Performed by: GENERAL ACUTE CARE HOSPITAL

## 2020-12-20 PROCEDURE — 6360000002 HC RX W HCPCS: Performed by: GENERAL ACUTE CARE HOSPITAL

## 2020-12-20 PROCEDURE — 97161 PT EVAL LOW COMPLEX 20 MIN: CPT

## 2020-12-20 PROCEDURE — 97165 OT EVAL LOW COMPLEX 30 MIN: CPT

## 2020-12-20 RX ADMIN — MAGNESIUM HYDROXIDE 30 ML: 400 SUSPENSION ORAL at 09:07

## 2020-12-20 RX ADMIN — VALSARTAN 80 MG: 80 TABLET, FILM COATED ORAL at 09:07

## 2020-12-20 RX ADMIN — SODIUM CHLORIDE, PRESERVATIVE FREE 10 ML: 5 INJECTION INTRAVENOUS at 09:13

## 2020-12-20 RX ADMIN — LAMOTRIGINE 200 MG: 100 TABLET ORAL at 09:07

## 2020-12-20 RX ADMIN — ENOXAPARIN SODIUM 30 MG: 30 INJECTION SUBCUTANEOUS at 09:06

## 2020-12-20 RX ADMIN — SODIUM CHLORIDE, PRESERVATIVE FREE 10 ML: 5 INJECTION INTRAVENOUS at 02:56

## 2020-12-20 RX ADMIN — Medication 10 ML: at 09:17

## 2020-12-20 RX ADMIN — MORPHINE SULFATE 2 MG: 2 INJECTION, SOLUTION INTRAMUSCULAR; INTRAVENOUS at 02:55

## 2020-12-20 RX ADMIN — PANTOPRAZOLE SODIUM 20 MG: 20 TABLET, DELAYED RELEASE ORAL at 09:07

## 2020-12-20 RX ADMIN — DOCUSATE SODIUM 50 MG AND SENNOSIDES 8.6 MG 1 TABLET: 8.6; 5 TABLET, FILM COATED ORAL at 09:06

## 2020-12-20 RX ADMIN — OXYCODONE HYDROCHLORIDE 10 MG: 5 TABLET ORAL at 09:06

## 2020-12-20 RX ADMIN — OXYCODONE HYDROCHLORIDE 10 MG: 5 TABLET ORAL at 17:13

## 2020-12-20 RX ADMIN — OXYCODONE HYDROCHLORIDE 10 MG: 5 TABLET ORAL at 13:35

## 2020-12-20 RX ADMIN — LORAZEPAM 0.5 MG: 2 INJECTION INTRAMUSCULAR; INTRAVENOUS at 01:43

## 2020-12-20 RX ADMIN — BUPROPION HYDROCHLORIDE 100 MG: 100 TABLET, EXTENDED RELEASE ORAL at 09:08

## 2020-12-20 RX ADMIN — LORAZEPAM 0.5 MG: 2 INJECTION INTRAMUSCULAR; INTRAVENOUS at 17:16

## 2020-12-20 RX ADMIN — SERTRALINE HYDROCHLORIDE 75 MG: 50 TABLET ORAL at 09:06

## 2020-12-20 ASSESSMENT — ENCOUNTER SYMPTOMS
COUGH: 0
SHORTNESS OF BREATH: 0
DIARRHEA: 0
VOMITING: 0
NAUSEA: 0

## 2020-12-20 ASSESSMENT — PAIN SCALES - GENERAL
PAINLEVEL_OUTOF10: 7
PAINLEVEL_OUTOF10: 10
PAINLEVEL_OUTOF10: 0
PAINLEVEL_OUTOF10: 8

## 2020-12-20 ASSESSMENT — PAIN DESCRIPTION - PAIN TYPE
TYPE: SURGICAL PAIN
TYPE: SURGICAL PAIN

## 2020-12-20 ASSESSMENT — PAIN DESCRIPTION - ORIENTATION
ORIENTATION: LEFT
ORIENTATION: LEFT

## 2020-12-20 ASSESSMENT — PAIN DESCRIPTION - LOCATION
LOCATION: ANKLE
LOCATION: ANKLE

## 2020-12-20 ASSESSMENT — PAIN DESCRIPTION - DIRECTION: RADIATING_TOWARDS: UP LEG

## 2020-12-20 ASSESSMENT — PAIN DESCRIPTION - FREQUENCY
FREQUENCY: INTERMITTENT
FREQUENCY: INTERMITTENT

## 2020-12-20 ASSESSMENT — PAIN DESCRIPTION - PROGRESSION
CLINICAL_PROGRESSION: RAPIDLY IMPROVING
CLINICAL_PROGRESSION: NOT CHANGED
CLINICAL_PROGRESSION: GRADUALLY WORSENING

## 2020-12-20 ASSESSMENT — PAIN DESCRIPTION - DESCRIPTORS
DESCRIPTORS: PRESSURE;SQUEEZING;CONSTANT
DESCRIPTORS: ACHING;DISCOMFORT;TIGHTNESS

## 2020-12-20 ASSESSMENT — PAIN DESCRIPTION - ONSET
ONSET: ON-GOING
ONSET: ON-GOING

## 2020-12-20 ASSESSMENT — PAIN - FUNCTIONAL ASSESSMENT
PAIN_FUNCTIONAL_ASSESSMENT: ACTIVITIES ARE NOT PREVENTED
PAIN_FUNCTIONAL_ASSESSMENT: PREVENTS OR INTERFERES SOME ACTIVE ACTIVITIES AND ADLS
PAIN_FUNCTIONAL_ASSESSMENT: PREVENTS OR INTERFERES SOME ACTIVE ACTIVITIES AND ADLS

## 2020-12-20 NOTE — PROGRESS NOTES
Initial Evaluation    Attending Provider:  Lorie Runner, DO    Evaluating PT:  Martin Miranda PT    Room #:  8140/8463-O   Diagnosis:  Closed bimall LT ankle fx, closed dislocation LT talus   Pertinent PMHx/PSHx:  Depression, Bipolar, HTN  Procedure/Surgery:  LT ankle ORIF   Precautions:  NWB, falls,   Equipment Needs:  [employer]    SUBJECTIVE:    Pt lives with family in a 2 story home with 2 stairs and 1 rail to enter. Pt ambulated with no AD PTA. OBJECTIVE:   Initial Evaluation  Date: 12/20/20 Treatment Short Term/ Long Term   Goals   Was pt agreeable to Eval/treatment? Yes     Does pt have pain? Yes LT ankle     Bed Mobility  Rolling: Indep  Supine to sit: LTLE assist Min, trunk Indep/S  Sit to supine: NA  Scooting: Indep  Indep all levels    Transfers Sit to stand: CGA/Min  Stand to sit: CGA  Stand pivot: NA  Indep all surfaces    Ambulation   2-3' feet with [employer] CGA x 2  25+' feet with [employer] S/Indep NWB LTLE   Stair negotiation: ascended and descended  TBA  3-4 steps with 1 rail CGA   ROM LT ankle NA     MMT WFL LTLE, ankle NA     AM-PAC 6 Clicks 40/13       Pt is A & O x 4   Edema:  Post op LT ankle/LE  Balance: sitting:Indep standing: CGA with [employer]   Endurance: Functional baseline     Patient education  Pt educated on NWB, bed transfer hand place, [employer] use NWB status,     Patient response to education:   Pt verbalized understanding Pt demonstrated skill Pt requires further education in this area   Yes Yes Yes review     ASSESSMENT:    Comments: In bed on arrival receptive for getting OOB into chair. Educated and understands/follows NWB status during transfers and mobility. Severe pain reported with minimal tolerances with all mvmts LTLE. Required repeated inst for hand place on bed top assist transfer into standing. Good compliance with NWB during mobility. Has adequate UE MMT to assist [employer] NWB status. Only able to take few steps into chair due to pain. Safe transfer into hip chair.   Call light, cell phone and wall phone provided. No footrests available on floor; used multiple blankets for LTLE elevation off floor. Treatment:  Patient practiced and was instructed in the following treatment:     Eval       Pt's/ family goals   1. To get home, have less pain    Patient and or family understand(s) diagnosis, prognosis, and plan of care. PLAN:    PT care will be provided in accordance with the objectives noted above. Exercises and functional mobility practice will be used as well as appropriate assistive devices or modalities to obtain goals. Patient and family education will also be administered as needed. Frequency of treatments:  BID as marianne       Total Treatment Time  15 minutes     Evaluation Time includes thorough review of current medical information, gathering information on past medical history/social history and prior level of function, completion of standardized testing/informal observation of tasks, assessment of data and education on plan of care and goals.     CPT codes:  [x] Low Complexity PT evaluation 28673  [] Moderate Complexity PT evaluation 39589  [] High Complexity PT evaluation 06965  [] PT Re-evaluation 92204  [] Gait training 07411 minutes  [] Manual therapy 48186  minutes  [] Therapeutic activities 39412  minutes  [] Therapeutic exercises 02079  minutes  [] Neuromuscular reeducation 86866  minutes    Sonia Fitzgerald PT

## 2020-12-20 NOTE — PROGRESS NOTES
Pt dangled at bedside and stood with walker non weight bearing to Lt ankle done well. Pt crying at times and emotional comfort care and support given to pt. Hourly rounds continued.

## 2020-12-20 NOTE — PROGRESS NOTES
Patient ambulated to bedside commode and urinated 200ml post son removal. Patient complains that she still feels the need to pee and did not empty bladder all the way. Per protocol and order will perform bladder scan if patient does not void more by 400pm.   Patient did not tolerate ambulation well, or being in chair.

## 2020-12-20 NOTE — ANESTHESIA POSTPROCEDURE EVALUATION
Department of Anesthesiology  Postprocedure Note    Patient: Verner Kale  MRN: [id number]  YOB: 1970  Date of evaluation: 12/19/2020  Time:  7:03 PM     Procedure Summary     Date: 12/19/20 Room / Location: [address]    Anesthesia Start: 5278 Anesthesia Stop: 0349    Procedure: LEFT ANKLE OPEN REDUCTION INTERNAL FIXATION (SYNTHES) (Left Ankle) Diagnosis: (ANKLE FRACTURE)    Surgeons: Tim Santana DO Responsible Provider: Benita Mercado MD    Anesthesia Type: general ASA Status: 2          Anesthesia Type: general    Carey Phase I: Carey Score: 9    Carey Phase II:      Last vitals: Reviewed and per EMR flowsheets.        Anesthesia Post Evaluation    Patient location during evaluation: PACU  Level of consciousness: awake  Airway patency: patent  Nausea & Vomiting: no nausea and no vomiting  Complications: no  Cardiovascular status: hemodynamically stable  Respiratory status: acceptable

## 2020-12-20 NOTE — PROGRESS NOTES
Progress Note  Date:2020       CDPQ:[id number]  Patient Name:Shivani Braden     YOB: 1970     Age:50 y.o. Patient seen for follow up of left bimalleolar fracture. Patient had increasing pain last evening. Orthopedics was contacted for dose adjustment. Patient this am states leg still in pain. She states its more of a stiffness type of pain. She denies any fevers, chills, chest pains, shortness of breath, nausea, vomiting or diarrhea. She is passing flatus but has not had a bowel movement. Subjective    Subjective:  Symptoms:  No shortness of breath, cough, chest pain, headache, chest pressure or diarrhea. Diet:  No nausea or vomiting. Activity level: Impaired due to pain. Review of Systems   Respiratory: Negative for cough and shortness of breath. Cardiovascular: Negative for chest pain. Gastrointestinal: Negative for diarrhea, nausea and vomiting. Objective         Vitals Last 24 Hours:  TEMPERATURE:  Temp  Av.4 °F (37.4 °C)  Min: 98 °F (36.7 °C)  Max: 99.9 °F (37.7 °C)  RESPIRATIONS RANGE: Resp  Av  Min: 1  Max: 25  PULSE OXIMETRY RANGE: SpO2  Av.2 %  Min: 97 %  Max: 100 %  PULSE RANGE: Pulse  Av.9  Min: 82  Max: 99  BLOOD PRESSURE RANGE: Systolic (62FGB), KTZ:845 , Min:79 , GIP:751   ; Diastolic (75NXY), JXO:96, Min:43, Max:67    I/O (24Hr): Intake/Output Summary (Last 24 hours) at 2020 0731  Last data filed at 2020 0534  Gross per 24 hour   Intake 03447 ml   Output 2475 ml   Net 7925 ml     Objective:  General Appearance:  Comfortable, well-appearing and in no acute distress. Vital signs: (most recent): Blood pressure (!) 145/67, pulse 92, temperature 99.8 °F (37.7 °C), temperature source Oral, resp. rate 16, height 5' 9\" (1.753 m), weight 223 lb 4.8 oz (101.3 kg), SpO2 98 %. Lungs:  Normal effort and normal respiratory rate. Breath sounds clear to auscultation. No rales or rhonchi. Heart: Normal rate.   Regular SYSTEM PROVIDED HISTORY: lt ankle orif TECHNOLOGIST PROVIDED HISTORY: Reason for exam:->lt ankle orif Intraprocedural imaging. FINDINGS: 10 spot images of the left ankle  were obtained. Intraprocedural fluoroscopic spot images as above. See separate procedure report for more information. Assessment//Plan           Hospital Problems           Last Modified POA    * (Principal) Ankle dislocation, left, initial encounter 12/18/2020 Yes    Urinary retention 12/18/2020 Yes    Mild persistent asthma without complication 20/15/8505 Yes    Chronic non-seasonal allergic rhinitis 12/18/2020 Yes    Environmental allergies 12/18/2020 Yes    Cigarette nicotine dependence in remission 12/18/2020 Yes    Ankle fracture, bimalleolar, closed, left, initial encounter 12/19/2020 Yes        Assessment & Plan    1. Left Bimalleolar fracture   2. Acute Urinary retentions secondary to opiates   3. Mild persistent asthma   4. Allergic rhinitis    Continue on pain control per orthopedics. Weight bearing and DVT prophylaxis per Ortho. Possibly home later today.  D/C son and try voiding trial.     Ronda Akers DO    Electronically signed by Ronda Akers DO on 12/20/20 at 7:31 AM EST

## 2020-12-20 NOTE — PROGRESS NOTES
Occupational Therapy  OCCUPATIONAL THERAPY INITIAL EVALUATION      Date:2020  Patient Name: Jessica Phillips  MRN: [id number]  : 1970  Room: [address] [place] [address]    Referring Provider: Jennifer Hull DO      Evaluating OT: Dick Yarbrough OTR/L [id number]    AM-PAC Daily Activity Raw Score:     Recommended Adaptive Equipment:  TBD     Diagnosis: ankle fracture. S/p fall    Surgery: L ankle ORIF 2020   Pertinent Medical History: asthma, HTN, bipolar   Precautions:  Falls, NWB L LE     Home Living: Pt lives with  and 16 and 20 year  children. 2 story with 2 steps to enter.   Bed/bath on 2nd.  1/2 bath on 1st  Bathroom setup: walk in shower      Prior Level of Function: Independent with ADLs , Independent  with IADLs; ambulated with no device   Driving: yes    Pain Level: L ankle pain ;   Cognition: alert, oriented x3 and conversing    Memory:  Good    Sequencing:  good   Problem solving:  Good    Judgement/safety:  Good      Functional Assessment:   Initial Eval Status  Date: 20 Treatment Status  Date: STGs = LTGs  Time frame: 5-7 days   Feeding Independent      Grooming Set-up ,seated   Independent    UB Dressing SBA/set-up   Independent    LB Dressing Max A   Mod I    Bathing Mod a   Mod I    Toileting Assist with thorough hygiene      Bed Mobility  Min A  Supine to sit      Functional Transfers Min A  Sit-stand from bed   Mod I    Functional Mobility Eduardo,w/walker   Steps next to bed   NWB L LE  Mod I with good tolerance    Balance Sitting:     Static:  Independent   Standing: Eduardo   Mod I   with good tolerance    Activity Tolerance Fair - with light activity   Pain limiting tolerance   Good with ADL activity    Visual/  Perceptual Glasses: yes                 Hand Dominance right    Strength ROM Additional Info:    RUE  5/5  WFL good  and wfl FMC/dexterity noted during ADL tasks       LUE 5/5  WFL good  and wfl FMC/dexterity noted during ADL tasks       Hearing: WFL   Sensation:  No c/o numbness or tingling   Tone: WFL     Comments: Upon arrival patient lying in bed . At end of session, patient sitting in chair  with call light and phone within reach, all lines and tubes intact. Overall patient demonstrated  decreased independence and safety during completion of ADL/functional transfer/mobility tasks. Pt would benefit from continued skilled OT to increase safety and independence with completion of ADL/IADL tasks for functional independence and quality of life. Assessment of current deficits   Functional mobility [x]  ADLs [x] Strength [x]  Cognition []  Functional transfers  [x] IADLs [x] Safety Awareness [x]  Endurance [x]  Fine Motor Coordination [] Balance [x] Vision/perception [] Sensation []   Gross Motor Coordination [] ROM [] Delirium []                  Motor Control []       Plan of Care:1-3 days/week for 1-2 weeks PRN   [x]ADL retraining/adapted techniques and AE recommendations to increase functional independence within precautions                    [x]Energy conservation techniques to improve tolerance for selfcare routine   [x]Functional transfer/mobility training/DME recommendations for increased independence, safety and fall prevention         [x]Patient/family education to increase safety and functional independence             [x]Environmental modifications for safe mobility and completion of ADLs                             []Cognitive retraining ex's to improve problem solving skills & safe participation in ADLs/IADLs     []Sensory re-education techniques to improve extremity awareness, maintain skin integrity and improve hand function                             []Visual/Perceptual retraining  to improve body awareness and safety during transfers and ADLs  []Splinting/positioning needs to maintain joint/skin integrity and prevent contractures  [x]Therapeutic activity to improve functional performance during ADLs.

## 2020-12-20 NOTE — DISCHARGE SUMMARY
Discharge Summary     Patient ID:  Greg Cespedes  [id number]  78 y.o. 1970 female  Latonia Coker MD        Admit date: 12/17/2020    Discharge date and time:  12/20/2020  3:59 PM      Activity level: Non-weight bearing to left leg. Diet:General  Disposition:Home  Condition on Discharge:Stable. Admit Diagnoses:   Patient Active Problem List   Diagnosis    Mild persistent asthma without complication    Chronic non-seasonal allergic rhinitis    Environmental allergies    Cigarette nicotine dependence in remission       Discharge Diagnoses: Principal Problem (Resolved): Ankle dislocation, left, initial encounter  Active Problems:    Mild persistent asthma without complication    Chronic non-seasonal allergic rhinitis    Environmental allergies    Cigarette nicotine dependence in remission  Resolved Problems:    Urinary retention    Ankle fracture, bimalleolar, closed, left, initial encounter  1. Left Bimalleolar fracture   2. Acute Urinary retentions secondary to opiates   3. Mild persistent asthma   4. Allergic rhinitis      Consults:  IP CONSULT TO ORTHOPEDIC SURGERY  IP CONSULT TO ORTHOPEDIC SURGERY  IP CONSULT TO INTERNAL MEDICINE  IP CONSULT TO SOCIAL WORK  IP CONSULT TO UROLOGY  IP CONSULT TO IV TEAM      Hospital Course: Patient is a 53year old female who presented to [address] [place] [address] due to fall at work. She suffered a left bimalleolar fracture. She was seen by orthopedics and taken to OR on 12/19/2020 where she had a left ankle open reduction and internal fixation with Dr. Poncho Miles. She had had acute urinary retention and was seen by urology that recommended son catheter that was removed prior to discharge. She did have difficulty with pain management which was adjust per orthopedics prior to discharge. Patient was discharged in stable condition. She is to follow up with Dr. Poncho Miles in 2 weeks. Follow up with Dr. Roselia Bailey in 1 week and follow up with Dr. Aleksey Foote as instructed. Discharge Exam:    General Appearance: Well-appearing, comfortable and in no acute distress. Vital signs: (most recent): Blood pressure (!) 144/60, pulse 88, temperature 99.1 °F (37.3 °C), temperature source Oral, resp. rate 16, height 5' 9\" (1.753 m), weight 210 lb (95.3 kg), SpO2 98 %. Lungs: Normal effort and normal respiratory rate. Breath sounds clear to auscultation. No rales or rhonchi. Heart: Normal rate. Regular rhythm. S1 normal and S2 normal. No friction rub. Abdomen: Abdomen is soft and non-distended. Bowel sounds are normal. There is no abdominal tenderness. There is no rebound tenderness. There is no guarding. Extremities: There is no dependent edema. (Left lower extremity bandaged. Toes normal capillary refill, warm to touch. )  Skin: Warm and dry. I/O last 3 completed shifts: In: 400 [P.O.:400]  Out: 2600 [Urine:2600]  No intake/output data recorded. LABS:  Recent Labs     12/18/20  0912 12/19/20  0435 12/20/20  0725    137 135   K 3.7 3.6 3.7    102 102   CO2 26 27 26   BUN 8 8 3*   CREATININE 0.9 0.9 0.7   GLUCOSE 102* 94 138*   CALCIUM 9.4 9.0 8.5*       Recent Labs     12/18/20  0912 12/19/20  0435 12/20/20  0725   WBC 9.2 7.5 8.6   RBC 4.09 3.75 3.48*   HGB 13.3 12.2 11.3*   HCT 41.7 38.7 35.3   .0* 103.2* 101.4*   MCH 32.5 32.5 32.5   MCHC 31.9* 31.5* 32.0   RDW 13.2 13.2 13.3    290 262   MPV 11.1 11.6 11.5       No results for input(s): GLUMET in the last 72 hours. Imaging:  Xr Ankle Left (2 Views)    Result Date: 12/17/2020  EXAMINATION: 2 XRAY VIEWS OF THE LEFT ANKLE 12/17/2020 3:56 pm COMPARISON: None. HISTORY: ORDERING SYSTEM PROVIDED HISTORY: fall ankle pain TECHNOLOGIST PROVIDED HISTORY: Reason for exam:->fall ankle pain FINDINGS: Anterolateral dislocation of the talus. Displaced medial malleolar and lateral malleolar fractures.     Anterolateral dislocation of the talus Displaced medial and lateral malleolar fracture    Xr Ankle release tablet Take 100 mg by mouth daily       zolpidem (AMBIEN) 10 MG tablet Take 5 mg by mouth nightly as needed for Sleep. Maurice Tsang       lamoTRIgine (LAMICTAL) 200 MG tablet Take 200 mg by mouth daily   Refills: 1         STOP taking these medications       dicyclomine (BENTYL) 20 MG tablet Comments:   Reason for Stopping:         propranolol (INDERAL) 10 MG tablet Comments:   Reason for Stopping:         predniSONE (DELTASONE) 20 MG tablet Comments:   Reason for Stopping:         ketorolac (TORADOL) 10 MG tablet Comments:   Reason for Stopping:         pantoprazole sodium (PROTONIX) 40 MG PACK packet Comments:   Reason for Stopping:         aspirin 81 MG tablet Comments:   Reason for Stopping:                 Note that more than 30 minutes was spent in preparing discharge papers, discussing discharge with patient, medication review, etc.      Signed:    Giacomo Baer DO    Electronically signed by Giacomo Baer DO on 12/20/2020 at 3:59 PM

## 2020-12-20 NOTE — PLAN OF CARE
Problem: Falls - Risk of:  Goal: Will remain free from falls  Description: Will remain free from falls  Outcome: Met This Shift  Goal: Absence of physical injury  Description: Absence of physical injury  Outcome: Met This Shift     Problem: Pain:  Goal: Pain level will decrease  Description: Pain level will decrease  Outcome: Met This Shift  Goal: Control of acute pain  Description: Control of acute pain  Outcome: Met This Shift  Goal: Control of chronic pain  Description: Control of chronic pain  Outcome: Met This Shift     Problem: Infection - Surgical Site:  Goal: Will show no infection signs and symptoms  Description: Will show no infection signs and symptoms  Outcome: Met This Shift     Problem: Anxiety:  Goal: Level of anxiety will decrease  Description: Level of anxiety will decrease  Outcome: Met This Shift

## 2020-12-20 NOTE — PROGRESS NOTES
Discharge teaching done at bedside. Reviewed post op instruction, new medications. Physical scripts were given to patient.

## 2023-04-25 ENCOUNTER — TELEPHONE (OUTPATIENT)
Dept: ENT CLINIC | Age: 53
End: 2023-04-25

## 2023-04-25 NOTE — TELEPHONE ENCOUNTER
Patient states that she is friends with Dr Joy Alva and he told her to tell us that when she calls so she can be seen sooner. She has clogged ears, I scheduled for first available and added to wait list. Please contact patient if she is able to fit on the schedule on a sooner date or time.  Thank you

## 2023-04-25 NOTE — TELEPHONE ENCOUNTER
Will speak with Dr. Daniel Solorzano    Electronically signed by Monalisa Malloy MA on 4/25/23 at 4:43 PM EDT

## 2023-04-26 NOTE — TELEPHONE ENCOUNTER
MARCIA darby for patient, her appt can be moved up to 5/19 with Dr. Deb Phelan    Electronically signed by Phil Lozano MA on 4/26/23 at 8:31 AM EDT

## 2023-05-19 ENCOUNTER — PROCEDURE VISIT (OUTPATIENT)
Dept: AUDIOLOGY | Age: 53
End: 2023-05-19

## 2023-05-19 ENCOUNTER — OFFICE VISIT (OUTPATIENT)
Dept: ENT CLINIC | Age: 53
End: 2023-05-19
Payer: COMMERCIAL

## 2023-05-19 VITALS — WEIGHT: 250 LBS | BODY MASS INDEX: 37.03 KG/M2 | HEIGHT: 69 IN

## 2023-05-19 DIAGNOSIS — J30.9 ALLERGIC RHINITIS, UNSPECIFIED SEASONALITY, UNSPECIFIED TRIGGER: Primary | ICD-10-CM

## 2023-05-19 DIAGNOSIS — H93.8X3 EAR PRESSURE, BILATERAL: Primary | ICD-10-CM

## 2023-05-19 DIAGNOSIS — R09.82 POST-NASAL DRAINAGE: ICD-10-CM

## 2023-05-19 PROCEDURE — 99203 OFFICE O/P NEW LOW 30 MIN: CPT | Performed by: OTOLARYNGOLOGY

## 2023-05-19 RX ORDER — AZELASTINE 1 MG/ML
1 SPRAY, METERED NASAL 2 TIMES DAILY
Qty: 30 ML | Refills: 3 | Status: SHIPPED | OUTPATIENT
Start: 2023-05-19

## 2023-05-19 ASSESSMENT — VISUAL ACUITY: OU: 1

## 2023-05-19 NOTE — PROGRESS NOTES
This patient was referred for audiometric/tympanometric testing by Dr. Niki Giles due to bilateral ear pressure. Tympanometry revealed normal middle ear peak pressure and compliance, bilaterally. The results were reviewed with the patient and physician. Recommendations for follow up will be made pending physician consult.     Ghulam Orozco/[facility]-A  [facility] Lic # [id number]

## 2023-07-07 ENCOUNTER — PROCEDURE VISIT (OUTPATIENT)
Dept: AUDIOLOGY | Age: 53
End: 2023-07-07
Payer: COMMERCIAL

## 2023-07-07 ENCOUNTER — OFFICE VISIT (OUTPATIENT)
Dept: ENT CLINIC | Age: 53
End: 2023-07-07
Payer: COMMERCIAL

## 2023-07-07 VITALS
BODY MASS INDEX: 32.58 KG/M2 | WEIGHT: 220 LBS | HEART RATE: 65 BPM | HEIGHT: 69 IN | SYSTOLIC BLOOD PRESSURE: 140 MMHG | OXYGEN SATURATION: 98 % | DIASTOLIC BLOOD PRESSURE: 87 MMHG

## 2023-07-07 DIAGNOSIS — H93.8X3 EAR PRESSURE, BILATERAL: Primary | ICD-10-CM

## 2023-07-07 DIAGNOSIS — H69.83 EUSTACHIAN TUBE DYSFUNCTION, BILATERAL: Primary | ICD-10-CM

## 2023-07-07 PROCEDURE — 69433 CREATE EARDRUM OPENING: CPT | Performed by: OTOLARYNGOLOGY

## 2023-07-07 PROCEDURE — 99213 OFFICE O/P EST LOW 20 MIN: CPT | Performed by: OTOLARYNGOLOGY

## 2023-07-07 PROCEDURE — 92567 TYMPANOMETRY: CPT | Performed by: AUDIOLOGIST

## 2023-07-07 ASSESSMENT — VISUAL ACUITY: OU: 1

## 2023-07-10 NOTE — PROGRESS NOTES
This patient was referred for audiometric/tympanometric testing by Dr. Taylor Ferguson due to eustachian tube dysfunction. Tympanometry revealed normal middle ear peak pressure and compliance, bilaterally. The results were reviewed with the patient and physician. Recommendations for follow up will be made pending physician consult.     Ghulam Crawford/[facility]  [facility] Lic # [id number]

## 2023-08-01 ENCOUNTER — OFFICE VISIT (OUTPATIENT)
Dept: ENT CLINIC | Age: 53
End: 2023-08-01
Payer: COMMERCIAL

## 2023-08-01 ENCOUNTER — TELEPHONE (OUTPATIENT)
Dept: ENT CLINIC | Age: 53
End: 2023-08-01

## 2023-08-01 VITALS
HEIGHT: 69 IN | DIASTOLIC BLOOD PRESSURE: 93 MMHG | TEMPERATURE: 97.5 F | WEIGHT: 210 LBS | BODY MASS INDEX: 31.1 KG/M2 | OXYGEN SATURATION: 99 % | SYSTOLIC BLOOD PRESSURE: 156 MMHG | HEART RATE: 63 BPM

## 2023-08-01 DIAGNOSIS — J30.9 ALLERGIC RHINITIS, UNSPECIFIED SEASONALITY, UNSPECIFIED TRIGGER: ICD-10-CM

## 2023-08-01 DIAGNOSIS — H69.83 EUSTACHIAN TUBE DYSFUNCTION, BILATERAL: Primary | ICD-10-CM

## 2023-08-01 PROCEDURE — 99213 OFFICE O/P EST LOW 20 MIN: CPT | Performed by: OTOLARYNGOLOGY

## 2023-08-01 ASSESSMENT — ENCOUNTER SYMPTOMS
EYE PAIN: 0
CHEST TIGHTNESS: 0
APNEA: 0
GASTROINTESTINAL NEGATIVE: 1
COLOR CHANGE: 0
ABDOMINAL PAIN: 0
EYE DISCHARGE: 0
EYES NEGATIVE: 1
RESPIRATORY NEGATIVE: 1
DIARRHEA: 0
VOMITING: 0
SHORTNESS OF BREATH: 0

## 2023-08-01 ASSESSMENT — VISUAL ACUITY: OU: 1

## 2023-08-01 NOTE — TELEPHONE ENCOUNTER
Notified Anette Kocher at Dr Luis Ramirez office regarding high BP. Patient asymptomatic. Dr Brenden Brown aware.

## 2023-08-01 NOTE — PROGRESS NOTES
Subjective:      Patient ID:  Be Barahona is a 47 y.o. female. HPI:  Pt returns for recheck of allergies. History of     When?  year:     Nasal Steroid: yes   Name: fluticasone (Flonase)   Still taking: yes   reason for stopping:     Other therapy:   Astelin- yes  oral antihistamine- none  leukotriene inhibitor- none  oral decongestant- none    which has been  effective     Pt has been on therapy for 1 month(s) and is doing well    Pt states she is doing better. The patient is complaining of nasal congestion and rhinorrhea    The patients worst time of year is fall and spring      Patient's medications, allergies, past medical, surgical, social and family histories were reviewed and updated as appropriate. Review of Systems   Constitutional: Negative. Negative for appetite change. HENT:  Positive for hearing loss, nosebleeds and postnasal drip. Ear pressure   Eyes: Negative. Negative for pain, discharge and visual disturbance. Respiratory: Negative. Negative for apnea, chest tightness and shortness of breath. Cardiovascular: Negative. Negative for chest pain, palpitations and leg swelling. Gastrointestinal: Negative. Negative for abdominal pain, diarrhea and vomiting. Endocrine: Negative for cold intolerance, heat intolerance and polydipsia. Genitourinary: Negative. Negative for dysuria, flank pain and hematuria. Musculoskeletal: Negative. Negative for arthralgias, gait problem and neck pain. Skin: Negative. Negative for color change, pallor and rash. Allergic/Immunologic: Positive for environmental allergies. Negative for food allergies and immunocompromised state. Neurological: Negative. Negative for dizziness, numbness and headaches. Hematological:  Negative for adenopathy. Psychiatric/Behavioral: Negative. Negative for behavioral problems and hallucinations. All other systems reviewed and are negative.             Objective:     Vitals:

## 2023-11-05 PROBLEM — J45.30 MILD PERSISTENT ASTHMA WITHOUT COMPLICATION (HHS-HCC): Status: ACTIVE | Noted: 2018-07-23

## 2023-11-05 PROBLEM — J30.89 CHRONIC NON-SEASONAL ALLERGIC RHINITIS: Status: ACTIVE | Noted: 2018-07-23

## 2023-11-05 PROBLEM — F17.211 CIGARETTE NICOTINE DEPENDENCE IN REMISSION: Status: ACTIVE | Noted: 2018-07-23

## 2023-11-05 RX ORDER — MONTELUKAST SODIUM 10 MG/1
1 TABLET ORAL DAILY
COMMUNITY
Start: 2023-06-27

## 2023-11-05 RX ORDER — ALBUTEROL SULFATE 0.63 MG/3ML
3 SOLUTION RESPIRATORY (INHALATION) EVERY 6 HOURS PRN
COMMUNITY
Start: 2020-03-25

## 2023-11-05 RX ORDER — ALBUTEROL SULFATE 90 UG/1
2 AEROSOL, METERED RESPIRATORY (INHALATION) EVERY 6 HOURS PRN
COMMUNITY
Start: 2023-04-19

## 2023-11-05 RX ORDER — LOSARTAN POTASSIUM 100 MG/1
100 TABLET ORAL DAILY
COMMUNITY
Start: 2022-01-24

## 2023-11-05 RX ORDER — DEXTROAMPHETAMINE SACCHARATE, AMPHETAMINE ASPARTATE, DEXTROAMPHETAMINE SULFATE AND AMPHETAMINE SULFATE 2.5; 2.5; 2.5; 2.5 MG/1; MG/1; MG/1; MG/1
1 TABLET ORAL AS NEEDED
COMMUNITY
Start: 2022-02-09

## 2023-11-05 RX ORDER — PROPRANOLOL HYDROCHLORIDE 10 MG/1
1 TABLET ORAL 2 TIMES DAILY
COMMUNITY
Start: 2021-12-20

## 2023-11-05 RX ORDER — AZELASTINE 1 MG/ML
1 SPRAY, METERED NASAL
COMMUNITY
Start: 2023-05-19

## 2023-11-05 RX ORDER — CETIRIZINE HYDROCHLORIDE 10 MG/1
1 TABLET ORAL DAILY
COMMUNITY

## 2023-11-05 RX ORDER — BUPROPION HYDROCHLORIDE 150 MG/1
1 TABLET, EXTENDED RELEASE ORAL DAILY
COMMUNITY
Start: 2022-01-25 | End: 2023-11-07 | Stop reason: ALTCHOICE

## 2023-11-05 RX ORDER — BUDESONIDE AND FORMOTEROL FUMARATE DIHYDRATE 160; 4.5 UG/1; UG/1
2 AEROSOL RESPIRATORY (INHALATION) 2 TIMES DAILY
COMMUNITY
Start: 2023-08-10

## 2023-11-05 RX ORDER — PHENOL/SODIUM PHENOLATE
1 AEROSOL, SPRAY (ML) MUCOUS MEMBRANE DAILY
COMMUNITY

## 2023-11-05 RX ORDER — LAMOTRIGINE 200 MG/1
1 TABLET ORAL DAILY
COMMUNITY
Start: 2018-03-17

## 2023-11-05 RX ORDER — SERTRALINE HYDROCHLORIDE 50 MG/1
100 TABLET, FILM COATED ORAL DAILY
COMMUNITY

## 2023-11-05 RX ORDER — ZOLPIDEM TARTRATE 10 MG/1
0.5 TABLET ORAL NIGHTLY PRN
Status: ON HOLD | COMMUNITY
End: 2023-11-27

## 2023-11-07 ENCOUNTER — OFFICE VISIT (OUTPATIENT)
Dept: CARDIOLOGY | Facility: CLINIC | Age: 53
End: 2023-11-07
Payer: COMMERCIAL

## 2023-11-07 ENCOUNTER — PREP FOR PROCEDURE (OUTPATIENT)
Dept: CARDIOLOGY | Facility: HOSPITAL | Age: 53
End: 2023-11-07

## 2023-11-07 VITALS
DIASTOLIC BLOOD PRESSURE: 91 MMHG | BODY MASS INDEX: 39.69 KG/M2 | WEIGHT: 268 LBS | OXYGEN SATURATION: 98 % | HEART RATE: 90 BPM | SYSTOLIC BLOOD PRESSURE: 154 MMHG | HEIGHT: 69 IN | TEMPERATURE: 97.7 F

## 2023-11-07 DIAGNOSIS — R06.02 SHORTNESS OF BREATH: ICD-10-CM

## 2023-11-07 DIAGNOSIS — R06.09 DOE (DYSPNEA ON EXERTION): Primary | ICD-10-CM

## 2023-11-07 DIAGNOSIS — I10 HYPERTENSION, UNSPECIFIED TYPE: ICD-10-CM

## 2023-11-07 DIAGNOSIS — R07.9 CHEST PAIN, UNSPECIFIED TYPE: ICD-10-CM

## 2023-11-07 DIAGNOSIS — R06.83 SNORING: ICD-10-CM

## 2023-11-07 DIAGNOSIS — R00.2 PALPITATIONS: ICD-10-CM

## 2023-11-07 DIAGNOSIS — R06.02 SHORTNESS OF BREATH: Primary | ICD-10-CM

## 2023-11-07 PROCEDURE — 3077F SYST BP >= 140 MM HG: CPT | Performed by: INTERNAL MEDICINE

## 2023-11-07 PROCEDURE — 99205 OFFICE O/P NEW HI 60 MIN: CPT | Performed by: INTERNAL MEDICINE

## 2023-11-07 PROCEDURE — 1036F TOBACCO NON-USER: CPT | Performed by: INTERNAL MEDICINE

## 2023-11-07 PROCEDURE — 3080F DIAST BP >= 90 MM HG: CPT | Performed by: INTERNAL MEDICINE

## 2023-11-07 RX ORDER — ARIPIPRAZOLE 2 MG/1
2 TABLET ORAL DAILY
COMMUNITY

## 2023-11-07 RX ORDER — SODIUM CHLORIDE 9 MG/ML
100 INJECTION, SOLUTION INTRAVENOUS CONTINUOUS
Status: CANCELLED | OUTPATIENT
Start: 2023-11-07

## 2023-11-07 RX ORDER — HYDROCHLOROTHIAZIDE 25 MG/1
25 TABLET ORAL DAILY
Qty: 30 TABLET | Refills: 11 | Status: SHIPPED | OUTPATIENT
Start: 2023-11-07 | End: 2024-11-06

## 2023-11-07 NOTE — Clinical Note
Bryna Noel, I would like to refer this patient to you for both right and left heart catheterizations. Please see my note for further details. Please let me know results when you have them. Much appreciated! Marilu

## 2023-11-07 NOTE — PATIENT INSTRUCTIONS
To reach Dr. Carrion's office please call [phone] ([facility]). Fax [phone]. Call [phone] to schedule an appointment. You may also contact the HF RNs at [email or web address]     Thank you for coming to your appointment today. If you have any questions or need cardiac medication refills, please call the Heart Failure Office at [phone] option [phone].   You may also contact the HF RNs at [email or web address]      We will reach out to you to schedule a right and left heart catheterization with Dr. Zapien at [facility]   Please get a BNP at any  lab  Please schedule to get a Holter Monitor for 2 weeks at [phone]  Start taking Hydrochlorothiazide 25 mg daily   Schedule a sleep study to test for sleep apnea   Schedule a follow up in 3-4 weeks

## 2023-11-07 NOTE — PROGRESS NOTES
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 She is self-referred as new patient.    She has not been feeling well for about a year, but worse recently. She tracks her BP at home and it is running high. In the last couple of months she has been feeling shortness of breath / dyspnea on exertion while getting dressed, and fatigue. She is having heart fluttering / palpitations once every few days, brief episodes. Also complaining of intermittent episodes of chest pain with activity.    Exam: BP (!) 154/91   Pulse 90   Temp 36.5 °C (97.7 °F) (Temporal)   Ht 1.753 m (5' 9\")   Wt 122 kg (268 lb)   SpO2 98%   BMI 39.58 kg/m²   Obesity  No JVD  RRR no murmurs  CTA  No LE edema    Current Outpatient Medications   Medication Instructions    albuterol 0.63 mg/3 mL nebulizer solution 3 mL, nebulization, Every 6 hours PRN    albuterol 90 mcg/actuation inhaler 2 puffs, inhalation, Every 6 hours PRN    amphetamine-dextroamphetamine (Adderall) 10 mg tablet 1 tablet, oral, As needed    ARIPiprazole (ABILIFY) 2 mg, oral, Daily    azelastine (Astelin) 137 mcg (0.1 %) nasal spray 1 spray, nasal    budesonide-formoteroL (Symbicort) 160-4.5 mcg/actuation inhaler 2 puffs, inhalation, 2 times daily    cetirizine (ZyrTEC) 10 mg tablet 1 tablet, oral, Daily    lamoTRIgine (LaMICtal) 200 mg tablet 1 tablet, oral, Daily    losartan (COZAAR) 100 mg, oral, Daily    montelukast (Singulair) 10 mg tablet 1 tablet, oral, Daily    omeprazole (PriLOSEC) 20 mg tablet,delayed release (DR/EC) EC tablet 1 tablet, oral, Daily    propranolol (Inderal) 10 mg tablet 1 tablet, oral, 2 times daily    sertraline (ZOLOFT) 100 mg, oral, Daily    zolpidem (Ambien) 10 mg tablet 0.5 tablets, oral, Nightly PRN     ECG done in clinic today: NSR 79 BPM, +LAE, no evidence for LVH    Past medical history (non-cardiac):  -- Hypertension  -- Bipolar disease, type 1 (depression > " "abelardo)  -- Past smoker, quit >15 years  -- Depression, on Adderall 10mg QD or BID as an \"upper\" (used to be on 30mg TID 5-6 years ago)  -- Asthma on singulair, symbicort  -- Allergies  -- GERD on prilosec  -- History of gastric ulcers  -- History of miscarriages and subsequently diagnosed with Clotting disorder (NTFHR); no other known blood clots  -- +Snores  -- Uterine ablation  -- Broken ankle s/p surgical repair    Assessment: 53WF with progressive (1) shortness of breath and dyspnea on exertion, (2) chest pain with exertion, (3) fatigue, and (4) palpitations. She had an echocardiogram done at [facility], I have the report describing moderate left atrial dilatation, and mild TR with moderate PHTN.     Differential diagnoses for her progressive worsening symptom burden: uncontrolled hypertension, ?HFpEF, ?angina    Plan:  -- For evaluation of symptoms and to rule-out HF: Left and right heart catheterization  -- Check BNP  -- Uncontrolled hypertension: Start hydrochlorothiazide 25mg every day, refer for sleep study (+HYPERTENSION, +snoring, +obesity)  -- Palpitations: Holter x 2 weeks    Marilu Carrion MD, MPH  Advanced Heart Failure and Transplant Cardiology  [facility]  [facility]      "

## 2023-11-08 ENCOUNTER — HOSPITAL ENCOUNTER (OUTPATIENT)
Facility: HOSPITAL | Age: 53
Setting detail: OUTPATIENT SURGERY
End: 2023-11-08
Attending: INTERNAL MEDICINE | Admitting: INTERNAL MEDICINE
Payer: COMMERCIAL

## 2023-11-08 DIAGNOSIS — R06.02 SHORTNESS OF BREATH: ICD-10-CM

## 2023-11-08 DIAGNOSIS — R07.9 CHEST PAIN, UNSPECIFIED TYPE: ICD-10-CM

## 2023-11-16 ENCOUNTER — HOSPITAL ENCOUNTER (OUTPATIENT)
Dept: CARDIOLOGY | Facility: HOSPITAL | Age: 53
Discharge: HOME | End: 2023-11-16
Payer: COMMERCIAL

## 2023-11-16 ENCOUNTER — LAB (OUTPATIENT)
Dept: LAB | Facility: LAB | Age: 53
End: 2023-11-16
Payer: COMMERCIAL

## 2023-11-16 DIAGNOSIS — R06.02 SHORTNESS OF BREATH: ICD-10-CM

## 2023-11-16 DIAGNOSIS — I20.89 CHRONIC STABLE ANGINA (CMS-HCC): Primary | ICD-10-CM

## 2023-11-16 DIAGNOSIS — R07.9 CHEST PAIN, UNSPECIFIED TYPE: ICD-10-CM

## 2023-11-16 DIAGNOSIS — R00.2 PALPITATIONS: ICD-10-CM

## 2023-11-16 LAB
ANION GAP SERPL CALC-SCNC: 14 MMOL/L (ref 10–20)
BNP SERPL-MCNC: 44 PG/ML (ref 0–99)
BUN SERPL-MCNC: 11 MG/DL (ref 6–23)
CALCIUM SERPL-MCNC: 9.6 MG/DL (ref 8.6–10.3)
CHLORIDE SERPL-SCNC: 98 MMOL/L (ref 98–107)
CO2 SERPL-SCNC: 28 MMOL/L (ref 21–32)
CREAT SERPL-MCNC: 0.94 MG/DL (ref 0.5–1.05)
ERYTHROCYTE [DISTWIDTH] IN BLOOD BY AUTOMATED COUNT: 13.3 % (ref 11.5–14.5)
GFR SERPL CREATININE-BSD FRML MDRD: 73 ML/MIN/1.73M*2
GLUCOSE SERPL-MCNC: 74 MG/DL (ref 74–99)
HCT VFR BLD AUTO: 43.9 % (ref 36–46)
HGB BLD-MCNC: 14 G/DL (ref 12–16)
MCH RBC QN AUTO: 30.8 PG (ref 26–34)
MCHC RBC AUTO-ENTMCNC: 31.9 G/DL (ref 32–36)
MCV RBC AUTO: 97 FL (ref 80–100)
NRBC BLD-RTO: 0 /100 WBCS (ref 0–0)
PLATELET # BLD AUTO: 467 X10*3/UL (ref 150–450)
POTASSIUM SERPL-SCNC: 4.4 MMOL/L (ref 3.5–5.3)
RBC # BLD AUTO: 4.55 X10*6/UL (ref 4–5.2)
SODIUM SERPL-SCNC: 136 MMOL/L (ref 136–145)
WBC # BLD AUTO: 8.8 X10*3/UL (ref 4.4–11.3)

## 2023-11-16 PROCEDURE — 93246 EXT ECG>7D<15D RECORDING: CPT

## 2023-11-16 PROCEDURE — 93248 EXT ECG>7D<15D REV&INTERPJ: CPT | Performed by: INTERNAL MEDICINE

## 2023-11-16 PROCEDURE — 85027 COMPLETE CBC AUTOMATED: CPT

## 2023-11-16 PROCEDURE — 83880 ASSAY OF NATRIURETIC PEPTIDE: CPT

## 2023-11-16 PROCEDURE — 80048 BASIC METABOLIC PNL TOTAL CA: CPT

## 2023-11-16 PROCEDURE — 36415 COLL VENOUS BLD VENIPUNCTURE: CPT

## 2023-11-16 NOTE — PROGRESS NOTES
I spoke to the insurance company this morning regarding denial.    I called Mrs. Kellogg to explain that [employer] is denying the coronary angiogram, and are telling me she must have a stress test first. Despite >30 minutes on the phone with [id number] reviewer he would not approve it.    She told me that her BP at home in the evening is 118/78 mmHg. She continues having angina (chest pain with exertion).    Will proceed with stress echocardiogram.    Marilu Carrion

## 2023-11-21 ENCOUNTER — APPOINTMENT (OUTPATIENT)
Dept: CARDIOLOGY | Facility: HOSPITAL | Age: 53
End: 2023-11-21
Payer: COMMERCIAL

## 2023-11-22 ENCOUNTER — HOSPITAL ENCOUNTER (OUTPATIENT)
Dept: CARDIOLOGY | Facility: HOSPITAL | Age: 53
Discharge: HOME | End: 2023-11-22
Payer: COMMERCIAL

## 2023-11-22 DIAGNOSIS — R07.9 CHEST PAIN, UNSPECIFIED: ICD-10-CM

## 2023-11-22 DIAGNOSIS — I20.89 CHRONIC STABLE ANGINA (CMS-HCC): ICD-10-CM

## 2023-11-22 PROCEDURE — 93016 CV STRESS TEST SUPVJ ONLY: CPT | Performed by: INTERNAL MEDICINE

## 2023-11-22 PROCEDURE — 93350 STRESS TTE ONLY: CPT | Performed by: INTERNAL MEDICINE

## 2023-11-22 PROCEDURE — 93018 CV STRESS TEST I&R ONLY: CPT | Performed by: INTERNAL MEDICINE

## 2023-11-22 PROCEDURE — 93350 STRESS TTE ONLY: CPT

## 2023-11-27 ENCOUNTER — TELEPHONE (OUTPATIENT)
Dept: CARDIOLOGY | Facility: CLINIC | Age: 53
End: 2023-11-27
Payer: COMMERCIAL

## 2023-11-27 ENCOUNTER — HOSPITAL ENCOUNTER (OUTPATIENT)
Facility: HOSPITAL | Age: 53
Setting detail: OBSERVATION
LOS: 1 days | Discharge: HOME | DRG: 204 | End: 2023-11-29
Attending: INTERNAL MEDICINE | Admitting: STUDENT IN AN ORGANIZED HEALTH CARE EDUCATION/TRAINING PROGRAM
Payer: COMMERCIAL

## 2023-11-27 DIAGNOSIS — R07.9 CHEST PAIN, UNSPECIFIED TYPE: ICD-10-CM

## 2023-11-27 DIAGNOSIS — I50.33 ACUTE ON CHRONIC DIASTOLIC (CONGESTIVE) HEART FAILURE (MULTI): ICD-10-CM

## 2023-11-27 DIAGNOSIS — R07.9 CHEST PAIN, UNSPECIFIED TYPE: Primary | ICD-10-CM

## 2023-11-27 DIAGNOSIS — I50.9 HEART FAILURE (MULTI): Primary | ICD-10-CM

## 2023-11-27 DIAGNOSIS — R06.83 SNORING: Primary | ICD-10-CM

## 2023-11-27 DIAGNOSIS — R06.02 SHORTNESS OF BREATH: ICD-10-CM

## 2023-11-27 PROCEDURE — 1100000001 HC PRIVATE ROOM DAILY

## 2023-11-27 PROCEDURE — 99221 1ST HOSP IP/OBS SF/LOW 40: CPT | Performed by: STUDENT IN AN ORGANIZED HEALTH CARE EDUCATION/TRAINING PROGRAM

## 2023-11-27 PROCEDURE — 2500000002 HC RX 250 W HCPCS SELF ADMINISTERED DRUGS (ALT 637 FOR MEDICARE OP, ALT 636 FOR OP/ED): Performed by: STUDENT IN AN ORGANIZED HEALTH CARE EDUCATION/TRAINING PROGRAM

## 2023-11-27 PROCEDURE — 94760 N-INVAS EAR/PLS OXIMETRY 1: CPT

## 2023-11-27 PROCEDURE — G0378 HOSPITAL OBSERVATION PER HR: HCPCS

## 2023-11-27 PROCEDURE — 2500000004 HC RX 250 GENERAL PHARMACY W/ HCPCS (ALT 636 FOR OP/ED): Performed by: STUDENT IN AN ORGANIZED HEALTH CARE EDUCATION/TRAINING PROGRAM

## 2023-11-27 PROCEDURE — 2500000001 HC RX 250 WO HCPCS SELF ADMINISTERED DRUGS (ALT 637 FOR MEDICARE OP): Performed by: STUDENT IN AN ORGANIZED HEALTH CARE EDUCATION/TRAINING PROGRAM

## 2023-11-27 RX ORDER — LAMOTRIGINE 200 MG/1
200 TABLET ORAL DAILY
Status: DISCONTINUED | OUTPATIENT
Start: 2023-11-27 | End: 2023-11-29 | Stop reason: HOSPADM

## 2023-11-27 RX ORDER — SERTRALINE HYDROCHLORIDE 100 MG/1
100 TABLET, FILM COATED ORAL DAILY
Status: DISCONTINUED | OUTPATIENT
Start: 2023-11-27 | End: 2023-11-29 | Stop reason: HOSPADM

## 2023-11-27 RX ORDER — LOSARTAN POTASSIUM 50 MG/1
100 TABLET ORAL DAILY
Status: DISCONTINUED | OUTPATIENT
Start: 2023-11-27 | End: 2023-11-29 | Stop reason: HOSPADM

## 2023-11-27 RX ORDER — AZELASTINE 1 MG/ML
1 SPRAY, METERED NASAL DAILY
Status: DISCONTINUED | OUTPATIENT
Start: 2023-11-27 | End: 2023-11-29 | Stop reason: HOSPADM

## 2023-11-27 RX ORDER — LORATADINE 10 MG/1
10 TABLET ORAL DAILY
Status: DISCONTINUED | OUTPATIENT
Start: 2023-11-27 | End: 2023-11-29 | Stop reason: HOSPADM

## 2023-11-27 RX ORDER — PANTOPRAZOLE SODIUM 20 MG/1
20 TABLET, DELAYED RELEASE ORAL
Status: DISCONTINUED | OUTPATIENT
Start: 2023-11-28 | End: 2023-11-29 | Stop reason: HOSPADM

## 2023-11-27 RX ORDER — FLUTICASONE FUROATE AND VILANTEROL 200; 25 UG/1; UG/1
1 POWDER RESPIRATORY (INHALATION)
Status: DISCONTINUED | OUTPATIENT
Start: 2023-11-28 | End: 2023-11-29 | Stop reason: HOSPADM

## 2023-11-27 RX ORDER — HYDROCHLOROTHIAZIDE 25 MG/1
25 TABLET ORAL DAILY
Status: DISCONTINUED | OUTPATIENT
Start: 2023-11-27 | End: 2023-11-29 | Stop reason: HOSPADM

## 2023-11-27 RX ORDER — PROPRANOLOL HYDROCHLORIDE 10 MG/1
10 TABLET ORAL 2 TIMES DAILY
Status: DISCONTINUED | OUTPATIENT
Start: 2023-11-27 | End: 2023-11-29 | Stop reason: HOSPADM

## 2023-11-27 RX ORDER — POLYETHYLENE GLYCOL 3350 17 G/17G
17 POWDER, FOR SOLUTION ORAL DAILY
Status: DISCONTINUED | OUTPATIENT
Start: 2023-11-27 | End: 2023-11-29 | Stop reason: HOSPADM

## 2023-11-27 RX ORDER — ARIPIPRAZOLE 2 MG/1
2 TABLET ORAL DAILY
Status: DISCONTINUED | OUTPATIENT
Start: 2023-11-27 | End: 2023-11-29 | Stop reason: HOSPADM

## 2023-11-27 RX ORDER — ZOLPIDEM TARTRATE 10 MG/1
10 TABLET ORAL NIGHTLY PRN
COMMUNITY

## 2023-11-27 RX ORDER — MONTELUKAST SODIUM 10 MG/1
10 TABLET ORAL DAILY
Status: DISCONTINUED | OUTPATIENT
Start: 2023-11-27 | End: 2023-11-29 | Stop reason: HOSPADM

## 2023-11-27 RX ORDER — ALBUTEROL SULFATE 90 UG/1
2 AEROSOL, METERED RESPIRATORY (INHALATION) EVERY 6 HOURS PRN
Status: DISCONTINUED | OUTPATIENT
Start: 2023-11-27 | End: 2023-11-29 | Stop reason: HOSPADM

## 2023-11-27 RX ORDER — ZOLPIDEM TARTRATE 5 MG/1
10 TABLET ORAL NIGHTLY PRN
Status: DISCONTINUED | OUTPATIENT
Start: 2023-11-27 | End: 2023-11-29 | Stop reason: HOSPADM

## 2023-11-27 RX ADMIN — LAMOTRIGINE 200 MG: 200 TABLET ORAL at 22:28

## 2023-11-27 RX ADMIN — SERTRALINE 100 MG: 100 TABLET, FILM COATED ORAL at 21:08

## 2023-11-27 RX ADMIN — HYDROCHLOROTHIAZIDE 25 MG: 25 TABLET ORAL at 21:08

## 2023-11-27 RX ADMIN — PROPRANOLOL HYDROCHLORIDE 10 MG: 10 TABLET ORAL at 22:28

## 2023-11-27 RX ADMIN — MONTELUKAST 10 MG: 10 TABLET, FILM COATED ORAL at 21:08

## 2023-11-27 RX ADMIN — ZOLPIDEM TARTRATE 10 MG: 5 TABLET ORAL at 22:28

## 2023-11-27 RX ADMIN — LOSARTAN POTASSIUM 100 MG: 50 TABLET, FILM COATED ORAL at 21:08

## 2023-11-27 RX ADMIN — AZELASTINE HYDROCHLORIDE 1 SPRAY: 137 SPRAY, METERED NASAL at 22:28

## 2023-11-27 RX ADMIN — ARIPIPRAZOLE 2 MG: 2 TABLET ORAL at 22:28

## 2023-11-27 RX ADMIN — LORATADINE 10 MG: 10 TABLET ORAL at 22:28

## 2023-11-27 SDOH — SOCIAL STABILITY: SOCIAL INSECURITY: HAVE YOU HAD THOUGHTS OF HARMING ANYONE ELSE?: NO

## 2023-11-27 SDOH — SOCIAL STABILITY: SOCIAL INSECURITY: DO YOU FEEL UNSAFE GOING BACK TO THE PLACE WHERE YOU ARE LIVING?: NO

## 2023-11-27 SDOH — SOCIAL STABILITY: SOCIAL INSECURITY: DOES ANYONE TRY TO KEEP YOU FROM HAVING/CONTACTING OTHER FRIENDS OR DOING THINGS OUTSIDE YOUR HOME?: NO

## 2023-11-27 SDOH — SOCIAL STABILITY: SOCIAL INSECURITY: HAS ANYONE EVER THREATENED TO HURT YOUR FAMILY OR YOUR PETS?: NO

## 2023-11-27 SDOH — SOCIAL STABILITY: SOCIAL INSECURITY: ARE YOU OR HAVE YOU BEEN THREATENED OR ABUSED PHYSICALLY, EMOTIONALLY, OR SEXUALLY BY ANYONE?: NO

## 2023-11-27 SDOH — SOCIAL STABILITY: SOCIAL INSECURITY: ARE THERE ANY APPARENT SIGNS OF INJURIES/BEHAVIORS THAT COULD BE RELATED TO ABUSE/NEGLECT?: NO

## 2023-11-27 SDOH — SOCIAL STABILITY: SOCIAL INSECURITY: WERE YOU ABLE TO COMPLETE ALL THE BEHAVIORAL HEALTH SCREENINGS?: YES

## 2023-11-27 SDOH — SOCIAL STABILITY: SOCIAL INSECURITY: ABUSE: ADULT

## 2023-11-27 SDOH — SOCIAL STABILITY: SOCIAL INSECURITY: DO YOU FEEL ANYONE HAS EXPLOITED OR TAKEN ADVANTAGE OF YOU FINANCIALLY OR OF YOUR PERSONAL PROPERTY?: NO

## 2023-11-27 ASSESSMENT — PAIN SCALES - GENERAL
PAINLEVEL_OUTOF10: 0 - NO PAIN
PAINLEVEL_OUTOF10: 0 - NO PAIN

## 2023-11-27 ASSESSMENT — COGNITIVE AND FUNCTIONAL STATUS - GENERAL
DAILY ACTIVITIY SCORE: 24
PATIENT BASELINE BEDBOUND: NO
MOBILITY SCORE: 24

## 2023-11-27 ASSESSMENT — LIFESTYLE VARIABLES
HOW OFTEN DURING THE LAST YEAR HAVE YOU HAD A FEELING OF GUILT OR REMORSE AFTER DRINKING: NEVER
HOW OFTEN DO YOU HAVE 6 OR MORE DRINKS ON ONE OCCASION: NEVER
HOW OFTEN DURING THE LAST YEAR HAVE YOU FAILED TO DO WHAT WAS NORMALLY EXPECTED FROM YOU BECAUSE OF DRINKING: NEVER
SUBSTANCE_ABUSE_PAST_12_MONTHS: NO
SKIP TO QUESTIONS 9-10: 1
HAS A RELATIVE, FRIEND, DOCTOR, OR ANOTHER HEALTH PROFESSIONAL EXPRESSED CONCERN ABOUT YOUR DRINKING OR SUGGESTED YOU CUT DOWN: NO
HOW OFTEN DURING THE LAST YEAR HAVE YOU NEEDED AN ALCOHOLIC DRINK FIRST THING IN THE MORNING TO GET YOURSELF GOING AFTER A NIGHT OF HEAVY DRINKING: NEVER
HAVE YOU OR SOMEONE ELSE BEEN INJURED AS A RESULT OF YOUR DRINKING: NO
HOW OFTEN DO YOU HAVE A DRINK CONTAINING ALCOHOL: 2-3 TIMES A WEEK
HOW MANY STANDARD DRINKS CONTAINING ALCOHOL DO YOU HAVE ON A TYPICAL DAY: 1 OR 2
AUDIT-C TOTAL SCORE: 3
PRESCIPTION_ABUSE_PAST_12_MONTHS: NO
HOW OFTEN DURING THE LAST YEAR HAVE YOU FOUND THAT YOU WERE NOT ABLE TO STOP DRINKING ONCE YOU HAD STARTED: NEVER
HOW OFTEN DURING THE LAST YEAR HAVE YOU BEEN UNABLE TO REMEMBER WHAT HAPPENED THE NIGHT BEFORE BECAUSE YOU HAD BEEN DRINKING: NEVER
AUDIT-C TOTAL SCORE: 3
AUDIT TOTAL SCORE: 3
AUDIT TOTAL SCORE: 0

## 2023-11-27 ASSESSMENT — ACTIVITIES OF DAILY LIVING (ADL)
ADEQUATE_TO_COMPLETE_ADL: YES
HEARING - LEFT EAR: FUNCTIONAL
WALKS IN HOME: INDEPENDENT
HEARING - RIGHT EAR: FUNCTIONAL
PATIENT'S MEMORY ADEQUATE TO SAFELY COMPLETE DAILY ACTIVITIES?: YES
TOILETING: INDEPENDENT
FEEDING YOURSELF: INDEPENDENT
LACK_OF_TRANSPORTATION: NO
DRESSING YOURSELF: INDEPENDENT
GROOMING: INDEPENDENT
BATHING: INDEPENDENT
JUDGMENT_ADEQUATE_SAFELY_COMPLETE_DAILY_ACTIVITIES: YES

## 2023-11-27 ASSESSMENT — PAIN - FUNCTIONAL ASSESSMENT: PAIN_FUNCTIONAL_ASSESSMENT: 0-10

## 2023-11-27 ASSESSMENT — PATIENT HEALTH QUESTIONNAIRE - PHQ9
1. LITTLE INTEREST OR PLEASURE IN DOING THINGS: NOT AT ALL
SUM OF ALL RESPONSES TO PHQ9 QUESTIONS 1 & 2: 0
2. FEELING DOWN, DEPRESSED OR HOPELESS: NOT AT ALL

## 2023-11-27 NOTE — Clinical Note
Catheter inserted. IM Cimetidine Counseling:  I discussed with the patient the risks of Cimetidine including but not limited to gynecomastia, headache, diarrhea, nausea, drowsiness, arrhythmias, pancreatitis, skin rashes, psychosis, bone marrow suppression and kidney toxicity.

## 2023-11-27 NOTE — TELEPHONE ENCOUNTER
I spoke to the patient by phone about her stress echocardiogram that was done a few days ago. She told me that she felt unwell during the test and had dyspnea. The report describes normal LV function with stress, but there was a rise in RVSP. Further she was only able to achieve 84% MPHR with makes the findings of the test inconclusive.    I recommend:  -- Proceed with coronary angiogram (LHC) and RHC  -- Will order a home sleep study test to assess for sleep apnea, as her insurance denied a formal lab test    ADDENDUM: I spoke to her by phone again just now. She feels unwell with dyspnea and chest pressure. Will direct admit her to the general cardiology floor service at [facility] for evaluation, to include left and right heart catheterization.    Marilu Carrion MD, MPH  Advanced Heart Failure and Transplant Cardiology  [facility]  [facility]

## 2023-11-27 NOTE — Clinical Note
Patient Clipped and Prepped: right groin, right radial and right brachial. Prepped with ChloraPrep, a minimum of 3 minute dry time, longer if needed, no pooling noted, patient draped in sterile fashion.

## 2023-11-28 LAB
ALBUMIN SERPL BCP-MCNC: 3.4 G/DL (ref 3.4–5)
ALP SERPL-CCNC: 112 U/L (ref 33–110)
ALT SERPL W P-5'-P-CCNC: 12 U/L (ref 7–45)
ANION GAP SERPL CALC-SCNC: 16 MMOL/L (ref 10–20)
AST SERPL W P-5'-P-CCNC: 16 U/L (ref 9–39)
BILIRUB SERPL-MCNC: 0.4 MG/DL (ref 0–1.2)
BUN SERPL-MCNC: 12 MG/DL (ref 6–23)
CALCIUM SERPL-MCNC: 9 MG/DL (ref 8.6–10.6)
CHLORIDE SERPL-SCNC: 101 MMOL/L (ref 98–107)
CO2 SERPL-SCNC: 24 MMOL/L (ref 21–32)
CREAT SERPL-MCNC: 0.86 MG/DL (ref 0.5–1.05)
ERYTHROCYTE [DISTWIDTH] IN BLOOD BY AUTOMATED COUNT: 13.3 % (ref 11.5–14.5)
GFR SERPL CREATININE-BSD FRML MDRD: 81 ML/MIN/1.73M*2
GLUCOSE SERPL-MCNC: 85 MG/DL (ref 74–99)
HCT VFR BLD AUTO: 38.6 % (ref 36–46)
HGB BLD-MCNC: 12.2 G/DL (ref 12–16)
MCH RBC QN AUTO: 30.4 PG (ref 26–34)
MCHC RBC AUTO-ENTMCNC: 31.6 G/DL (ref 32–36)
MCV RBC AUTO: 96 FL (ref 80–100)
NRBC BLD-RTO: 0 /100 WBCS (ref 0–0)
PLATELET # BLD AUTO: 325 X10*3/UL (ref 150–450)
POTASSIUM SERPL-SCNC: 4.2 MMOL/L (ref 3.5–5.3)
PROT SERPL-MCNC: 6 G/DL (ref 6.4–8.2)
RBC # BLD AUTO: 4.01 X10*6/UL (ref 4–5.2)
SARS-COV-2 RNA RESP QL NAA+PROBE: NOT DETECTED
SODIUM SERPL-SCNC: 137 MMOL/L (ref 136–145)
WBC # BLD AUTO: 9.7 X10*3/UL (ref 4.4–11.3)

## 2023-11-28 PROCEDURE — G0378 HOSPITAL OBSERVATION PER HR: HCPCS

## 2023-11-28 PROCEDURE — 4A023N8 MEASUREMENT OF CARDIAC SAMPLING AND PRESSURE, BILATERAL, PERCUTANEOUS APPROACH: ICD-10-PCS | Performed by: INTERNAL MEDICINE

## 2023-11-28 PROCEDURE — B2111ZZ FLUOROSCOPY OF MULTIPLE CORONARY ARTERIES USING LOW OSMOLAR CONTRAST: ICD-10-PCS | Performed by: INTERNAL MEDICINE

## 2023-11-28 PROCEDURE — 36415 COLL VENOUS BLD VENIPUNCTURE: CPT | Performed by: STUDENT IN AN ORGANIZED HEALTH CARE EDUCATION/TRAINING PROGRAM

## 2023-11-28 PROCEDURE — 2500000002 HC RX 250 W HCPCS SELF ADMINISTERED DRUGS (ALT 637 FOR MEDICARE OP, ALT 636 FOR OP/ED): Performed by: NURSE PRACTITIONER

## 2023-11-28 PROCEDURE — 2500000004 HC RX 250 GENERAL PHARMACY W/ HCPCS (ALT 636 FOR OP/ED): Performed by: STUDENT IN AN ORGANIZED HEALTH CARE EDUCATION/TRAINING PROGRAM

## 2023-11-28 PROCEDURE — 2500000002 HC RX 250 W HCPCS SELF ADMINISTERED DRUGS (ALT 637 FOR MEDICARE OP, ALT 636 FOR OP/ED): Performed by: STUDENT IN AN ORGANIZED HEALTH CARE EDUCATION/TRAINING PROGRAM

## 2023-11-28 PROCEDURE — 2500000001 HC RX 250 WO HCPCS SELF ADMINISTERED DRUGS (ALT 637 FOR MEDICARE OP): Performed by: STUDENT IN AN ORGANIZED HEALTH CARE EDUCATION/TRAINING PROGRAM

## 2023-11-28 PROCEDURE — 1200000002 HC GENERAL ROOM WITH TELEMETRY DAILY

## 2023-11-28 PROCEDURE — 87635 SARS-COV-2 COVID-19 AMP PRB: CPT | Performed by: STUDENT IN AN ORGANIZED HEALTH CARE EDUCATION/TRAINING PROGRAM

## 2023-11-28 PROCEDURE — 80053 COMPREHEN METABOLIC PANEL: CPT | Performed by: STUDENT IN AN ORGANIZED HEALTH CARE EDUCATION/TRAINING PROGRAM

## 2023-11-28 PROCEDURE — 99222 1ST HOSP IP/OBS MODERATE 55: CPT | Performed by: INTERNAL MEDICINE

## 2023-11-28 PROCEDURE — 85027 COMPLETE CBC AUTOMATED: CPT | Performed by: STUDENT IN AN ORGANIZED HEALTH CARE EDUCATION/TRAINING PROGRAM

## 2023-11-28 RX ORDER — ACETAMINOPHEN 325 MG/1
650 TABLET ORAL EVERY 4 HOURS PRN
Status: DISCONTINUED | OUTPATIENT
Start: 2023-11-28 | End: 2023-11-29 | Stop reason: HOSPADM

## 2023-11-28 RX ORDER — ACETAMINOPHEN 650 MG/1
650 SUPPOSITORY RECTAL EVERY 4 HOURS PRN
Status: DISCONTINUED | OUTPATIENT
Start: 2023-11-28 | End: 2023-11-29 | Stop reason: HOSPADM

## 2023-11-28 RX ORDER — ACETAMINOPHEN 160 MG/5ML
650 SOLUTION ORAL EVERY 4 HOURS PRN
Status: DISCONTINUED | OUTPATIENT
Start: 2023-11-28 | End: 2023-11-29 | Stop reason: HOSPADM

## 2023-11-28 RX ORDER — IPRATROPIUM BROMIDE AND ALBUTEROL SULFATE 2.5; .5 MG/3ML; MG/3ML
3 SOLUTION RESPIRATORY (INHALATION)
Status: DISCONTINUED | OUTPATIENT
Start: 2023-11-28 | End: 2023-11-29 | Stop reason: HOSPADM

## 2023-11-28 RX ADMIN — LAMOTRIGINE 200 MG: 200 TABLET ORAL at 21:28

## 2023-11-28 RX ADMIN — ZOLPIDEM TARTRATE 10 MG: 5 TABLET ORAL at 21:27

## 2023-11-28 RX ADMIN — PROPRANOLOL HYDROCHLORIDE 10 MG: 10 TABLET ORAL at 11:03

## 2023-11-28 RX ADMIN — LORATADINE 10 MG: 10 TABLET ORAL at 21:28

## 2023-11-28 RX ADMIN — PANTOPRAZOLE SODIUM 20 MG: 20 TABLET, DELAYED RELEASE ORAL at 06:07

## 2023-11-28 RX ADMIN — MONTELUKAST 10 MG: 10 TABLET, FILM COATED ORAL at 21:28

## 2023-11-28 RX ADMIN — HYDROCHLOROTHIAZIDE 25 MG: 25 TABLET ORAL at 21:28

## 2023-11-28 RX ADMIN — IPRATROPIUM BROMIDE AND ALBUTEROL SULFATE 3 ML: .5; 3 SOLUTION RESPIRATORY (INHALATION) at 18:42

## 2023-11-28 RX ADMIN — ARIPIPRAZOLE 2 MG: 2 TABLET ORAL at 21:28

## 2023-11-28 RX ADMIN — ACETAMINOPHEN 650 MG: 325 TABLET ORAL at 06:06

## 2023-11-28 RX ADMIN — PROPRANOLOL HYDROCHLORIDE 10 MG: 10 TABLET ORAL at 22:22

## 2023-11-28 RX ADMIN — SERTRALINE 100 MG: 100 TABLET, FILM COATED ORAL at 21:27

## 2023-11-28 RX ADMIN — AZELASTINE HYDROCHLORIDE 1 SPRAY: 137 SPRAY, METERED NASAL at 21:28

## 2023-11-28 RX ADMIN — LOSARTAN POTASSIUM 100 MG: 50 TABLET, FILM COATED ORAL at 21:28

## 2023-11-28 SDOH — SOCIAL STABILITY: SOCIAL INSECURITY: DOES ANYONE TRY TO KEEP YOU FROM HAVING/CONTACTING OTHER FRIENDS OR DOING THINGS OUTSIDE YOUR HOME?: NO

## 2023-11-28 SDOH — SOCIAL STABILITY: SOCIAL INSECURITY: DO YOU FEEL UNSAFE GOING BACK TO THE PLACE WHERE YOU ARE LIVING?: NO

## 2023-11-28 SDOH — SOCIAL STABILITY: SOCIAL INSECURITY: WERE YOU ABLE TO COMPLETE ALL THE BEHAVIORAL HEALTH SCREENINGS?: YES

## 2023-11-28 SDOH — SOCIAL STABILITY: SOCIAL INSECURITY: DO YOU FEEL ANYONE HAS EXPLOITED OR TAKEN ADVANTAGE OF YOU FINANCIALLY OR OF YOUR PERSONAL PROPERTY?: NO

## 2023-11-28 SDOH — SOCIAL STABILITY: SOCIAL INSECURITY: ARE YOU OR HAVE YOU BEEN THREATENED OR ABUSED PHYSICALLY, EMOTIONALLY, OR SEXUALLY BY ANYONE?: NO

## 2023-11-28 SDOH — SOCIAL STABILITY: SOCIAL INSECURITY: ARE THERE ANY APPARENT SIGNS OF INJURIES/BEHAVIORS THAT COULD BE RELATED TO ABUSE/NEGLECT?: NO

## 2023-11-28 SDOH — SOCIAL STABILITY: SOCIAL INSECURITY: ABUSE: ADULT

## 2023-11-28 SDOH — SOCIAL STABILITY: SOCIAL INSECURITY: HAS ANYONE EVER THREATENED TO HURT YOUR FAMILY OR YOUR PETS?: NO

## 2023-11-28 ASSESSMENT — PAIN SCALES - GENERAL: PAINLEVEL_OUTOF10: 0 - NO PAIN

## 2023-11-28 ASSESSMENT — COGNITIVE AND FUNCTIONAL STATUS - GENERAL
MOBILITY SCORE: 24
DAILY ACTIVITIY SCORE: 24

## 2023-11-28 ASSESSMENT — COLUMBIA-SUICIDE SEVERITY RATING SCALE - C-SSRS
1. IN THE PAST MONTH, HAVE YOU WISHED YOU WERE DEAD OR WISHED YOU COULD GO TO SLEEP AND NOT WAKE UP?: NO
6. HAVE YOU EVER DONE ANYTHING, STARTED TO DO ANYTHING, OR PREPARED TO DO ANYTHING TO END YOUR LIFE?: NO
2. HAVE YOU ACTUALLY HAD ANY THOUGHTS OF KILLING YOURSELF?: NO

## 2023-11-28 NOTE — PROGRESS NOTES
"Subjective Data:  - Plan for L/RHC, unable to do today, will make npo at midnight tomorrow for cath    Overnight Events:         Objective Data:  Last Recorded Vitals:  Vitals:    11/27/23 2001 11/27/23 2349 11/28/23 0446 11/28/23 0815   BP: 136/79 124/75 109/57 110/73   BP Location:       Patient Position:       Pulse: 84 86 74 75   Resp: 18 19 18 18   Temp: 36.8 °C (98.2 °F) 36.9 °C (98.4 °F) 36.7 °C (98.1 °F) 35.9 °C (96.6 °F)   TempSrc:       SpO2: 98% 94% 95% 93%   Weight:       Height:           Last Labs:  CBC - 11/28/2023:  6:44 AM  9.7 12.2 325    38.6      CMP - 11/28/2023:  6:44 AM  9.0 6.0 16 --- 0.4   _ 3.4 12 112      PTT - No results in last year.  _   _ _     BNP   Date/Time Value Ref Range Status   11/16/2023 09:16 AM 44 0 - 99 pg/mL Final      Last I/O:  No intake/output data recorded.    Past Cardiology Tests (Last 3 Years):  EKG:  No results found for this or any previous visit from the past 1095 days.    Echo:  Echocardiogram Stress Test 11/24/2023    Ejection Fractions:  No results found for: \"EF\"  Cath:  No results found for this or any previous visit from the past 1095 days.    Stress Test:  No results found for this or any previous visit from the past 1095 days.    Cardiac Imaging:  No results found for this or any previous visit from the past 1095 days.      Inpatient Medications:  Scheduled medications   Medication Dose Route Frequency    ARIPiprazole  2 mg oral Daily    azelastine  1 spray Each Nostril Daily    fluticasone furoate-vilanteroL  1 puff inhalation Daily    hydroCHLOROthiazide  25 mg oral Daily    lamoTRIgine  200 mg oral Daily    loratadine  10 mg oral Daily    losartan  100 mg oral Daily    montelukast  10 mg oral Daily    pantoprazole  20 mg oral Daily before breakfast    polyethylene glycol  17 g oral Daily    propranolol  10 mg oral BID    sertraline  100 mg oral Daily     PRN medications   Medication    acetaminophen    Or    acetaminophen    Or    acetaminophen    " albuterol    zolpidem     Continuous Medications   Medication Dose Last Rate       Physical Exam:  AO x3, in no distress  No JVD, supple neck  CTAB, no crackles/rales/wheezing  S1/S2 wnl, no mrg  Soft, nd, nt, +ve bs, no organomegaly  Warm extremities, trace edema, +5 strength of all extremities     Assessment/Plan   53 y.o. female with pmh of asthma, depression/anxiety who presents with subacute worsening MILLS and stress test demonstrating elevated R filling pressures during exercise.  admitted patient for further work up of patient's dyspnea. L/RHC tomorrow am.     MILLS,fatigue, chest pressure  - intermittent chest pressure at rest  - had stress test as o/p  - LHC/RHC for tomorrow, unable to do today   - npo at midnight   - plan confirmed with Dr. Carrion, patients outpatient cardiologist   - would consider pulm outpatient follow up for PFTs/CT or inpatient pulm consult     Allergic Rhinitis  Asthma  -c/w albuterol prn  -c/w breo-ellipta while inpatient, montelukast, loratadine, azelastine     HTN  -c/w losartan 100mg, hydrochlorothiazide 25mg, propranolol 10mg bid     MDD  -c/w setraline and aripiprazole  -adderal prn at home, on hold here     Seen and discussed with Dr. Aldridge   Code Status:  Full Code      Chico Geronimo, APRN-CNP, DNP

## 2023-11-28 NOTE — CARE PLAN
The patient's goals for the shift include wants to get well and go home    The clinical goals for the shift include patient will remain pain free this shift      Problem: Pain - Adult  Goal: Verbalizes/displays adequate comfort level or baseline comfort level  Outcome: Progressing     Problem: Safety - Adult  Goal: Free from fall injury  Outcome: Progressing     Problem: Discharge Planning  Goal: Discharge to home or other facility with appropriate resources  Outcome: Progressing     Problem: Chronic Conditions and Co-morbidities  Goal: Patient's chronic conditions and co-morbidity symptoms are monitored and maintained or improved  Outcome: Progressing

## 2023-11-28 NOTE — H&P
"History Of Present Illness:    Gracy Kellogg is a 53 y.o. female with pmh of asthma, depression/anxiety who presents with subacute worsening MILLS (minimal exertion; getting dressed) as of 6mo ago. Endorses LE edema (trace), but no PND or orthopnea. Family hx significant for father with AR s/p surgery and grandfather who passed away of cardiac disease (unknown). She sees her pulmonologist yearly (last spirometry 2 years ago).   Denies CP, cough, nasal congestion or wheezing.   Pt underwent recent stress test showing normal EF 55%, and stable E/e' measurements during stress BUT with mild elevation of RVSP to 28 at exercise compared to rest 23.      Last Recorded Vitals:  Vitals:    11/27/23 1948 11/27/23 1953 11/27/23 1959 11/27/23 2001   BP:    136/79   Pulse:   88 84   Resp:    18   Temp:    36.8 °C (98.2 °F)   SpO2:   98% 98%   Weight:  122 kg (268 lb 3.2 oz)     Height: 1.753 m (5' 9\") 1.753 m (5' 9\")         Last Labs:  CBC - 11/16/2023:  9:16 AM  8.8 14.0 467    43.9      CMP - 11/16/2023:  9:16 AM  9.6 _ _ --- _   _ _ _ _      PTT - No results in last year.  _   _ _     BNP   Date/Time Value Ref Range Status   11/16/2023 09:16 AM 44 0 - 99 pg/mL Final      Last I/O:  No intake/output data recorded.    Past Cardiology Tests (Last 3 Years):  EKG:  No results found for this or any previous visit from the past 1095 days.    Echo:  Echocardiogram Stress Test 11/24/2023    Ejection Fractions:  No results found for: \"EF\"  Cath:  No results found for this or any previous visit from the past 1095 days.    Stress Test:  No results found for this or any previous visit from the past 1095 days.    Cardiac Imaging:  No results found for this or any previous visit from the past 1095 days.      Past Medical History:  She has a past medical history of Asthma and Hypertension.    Past Surgical History:  She has a past surgical history that includes Back surgery; Breast surgery; Cosmetic surgery; Fracture surgery; and " Tonsillectomy.      Social History:  She reports that she has quit smoking. Her smoking use included cigarettes. She has never used smokeless tobacco. She reports current alcohol use of about 4.0 standard drinks of alcohol per week. She reports that she does not currently use drugs after having used the following drugs: Amphetamines.    Family History:  No family history on file.     Allergies:  Patient has no known allergies.    Inpatient Medications:  Scheduled medications   Medication Dose Route Frequency    ARIPiprazole  2 mg oral Daily    azelastine  1 spray Each Nostril Daily    [START ON 11/28/2023] fluticasone furoate-vilanteroL  1 puff inhalation Daily    hydroCHLOROthiazide  25 mg oral Daily    lamoTRIgine  200 mg oral Daily    loratadine  10 mg oral Daily    losartan  100 mg oral Daily    montelukast  10 mg oral Daily    [START ON 11/28/2023] pantoprazole  20 mg oral Daily before breakfast    polyethylene glycol  17 g oral Daily    propranolol  10 mg oral BID    sertraline  100 mg oral Daily     PRN medications   Medication    albuterol    zolpidem     Continuous Medications   Medication Dose Last Rate     Outpatient Medications:  Current Outpatient Medications   Medication Instructions    albuterol 0.63 mg/3 mL nebulizer solution 3 mL, nebulization, Every 6 hours PRN    albuterol 90 mcg/actuation inhaler 2 puffs, inhalation, Every 6 hours PRN    amphetamine-dextroamphetamine (Adderall) 10 mg tablet 1 tablet, oral, As needed    ARIPiprazole (ABILIFY) 2 mg, oral, Daily    azelastine (Astelin) 137 mcg (0.1 %) nasal spray 1 spray, nasal    budesonide-formoteroL (Symbicort) 160-4.5 mcg/actuation inhaler 2 puffs, inhalation, 2 times daily    cetirizine (ZyrTEC) 10 mg tablet 1 tablet, oral, Daily    hydroCHLOROthiazide (HYDRODIURIL) 25 mg, oral, Daily    lamoTRIgine (LaMICtal) 200 mg tablet 1 tablet, oral, Daily    losartan (COZAAR) 100 mg, oral, Daily    montelukast (Singulair) 10 mg tablet 1 tablet, oral,  Daily    omeprazole (PriLOSEC) 20 mg tablet,delayed release (DR/EC) EC tablet 1 tablet, oral, Daily    propranolol (Inderal) 10 mg tablet 1 tablet, oral, 2 times daily    sertraline (ZOLOFT) 100 mg, oral, Daily    zolpidem (AMBIEN) 10 mg, oral, Nightly PRN       Physical Exam  AO x3, in no distress  No JVD, supple neck  CTAB, no crackles/rales/wheezing  S1/S2 wnl, no mrg  Soft, nd, nt, +ve bs, no organomegaly  Warm extremities, trace edema, +5 strength of all extremities     Assessment/Plan   53 y.o. female with pmh of asthma, depression/anxiety who presents with subacute worsening MILLS and stress test demonstrating elevated R filling pressures during exercise.  admitted patient for further work up of patient's dyspnea. L/RHC tomorrow am.    #MILLS  [ ] LHC/RHC for tomorrow  - would consider pulm outpatient follow up for PFTs/CT or inpatient pulm consult    #Allergic Rhinitis  #Asthma  -c/w albuterol prn  -c/w breo-ellipta while inpatient, montelukast, loratadine, azelastine    #HTN  -c/w losartan 100mg, hydrochlorothiazide 25mg, propranolol 10mg bid    #MDD  -c/w setraline and aripiprazole  -adderal prn at home, on hold here       Code Status:  Full Code  Diet: NPO at 7am  I spent 60 minutes in the professional and overall care of this patient.  Giorgio Reynaga MD

## 2023-11-28 NOTE — PROGRESS NOTES
11/28/2023 Care coordination    53 yr old female with pmh of asthma, depression/anxiety who presents with subacute worsening MILLS and stress test demonstrating elevated R filling pressures during exercise.  admitted patient for further work up of patient's dyspnea. L/RHC tomorrow am .  Will cont to follow for any discharge needs.

## 2023-11-28 NOTE — INTERVAL H&P NOTE
H&P reviewed. The patient was examined and she reported a sense of chest pressure now & then with twinges of pain & some mild SOB.  She has had some mild MIKEY.  No changes to the H&P.

## 2023-11-29 VITALS
SYSTOLIC BLOOD PRESSURE: 113 MMHG | HEIGHT: 69 IN | HEART RATE: 69 BPM | OXYGEN SATURATION: 94 % | DIASTOLIC BLOOD PRESSURE: 64 MMHG | WEIGHT: 266.54 LBS | BODY MASS INDEX: 39.48 KG/M2 | RESPIRATION RATE: 18 BRPM | TEMPERATURE: 97.9 F

## 2023-11-29 PROBLEM — R07.9 CHEST PAIN: Status: RESOLVED | Noted: 2023-11-07 | Resolved: 2023-11-29

## 2023-11-29 PROBLEM — I50.9 HEART FAILURE (MULTI): Status: RESOLVED | Noted: 2023-11-27 | Resolved: 2023-11-29

## 2023-11-29 PROCEDURE — 94640 AIRWAY INHALATION TREATMENT: CPT

## 2023-11-29 PROCEDURE — 93460 R&L HRT ART/VENTRICLE ANGIO: CPT | Performed by: INTERNAL MEDICINE

## 2023-11-29 PROCEDURE — 2500000004 HC RX 250 GENERAL PHARMACY W/ HCPCS (ALT 636 FOR OP/ED): Performed by: STUDENT IN AN ORGANIZED HEALTH CARE EDUCATION/TRAINING PROGRAM

## 2023-11-29 PROCEDURE — 2720000007 HC OR 272 NO HCPCS: Performed by: INTERNAL MEDICINE

## 2023-11-29 PROCEDURE — 99152 MOD SED SAME PHYS/QHP 5/>YRS: CPT | Performed by: INTERNAL MEDICINE

## 2023-11-29 PROCEDURE — 2500000001 HC RX 250 WO HCPCS SELF ADMINISTERED DRUGS (ALT 637 FOR MEDICARE OP): Performed by: STUDENT IN AN ORGANIZED HEALTH CARE EDUCATION/TRAINING PROGRAM

## 2023-11-29 PROCEDURE — 2500000002 HC RX 250 W HCPCS SELF ADMINISTERED DRUGS (ALT 637 FOR MEDICARE OP, ALT 636 FOR OP/ED): Performed by: NURSE PRACTITIONER

## 2023-11-29 PROCEDURE — 2780000003 HC OR 278 NO HCPCS: Performed by: INTERNAL MEDICINE

## 2023-11-29 PROCEDURE — 2500000004 HC RX 250 GENERAL PHARMACY W/ HCPCS (ALT 636 FOR OP/ED): Performed by: INTERNAL MEDICINE

## 2023-11-29 PROCEDURE — 2550000001 HC RX 255 CONTRASTS: Performed by: INTERNAL MEDICINE

## 2023-11-29 PROCEDURE — G0378 HOSPITAL OBSERVATION PER HR: HCPCS

## 2023-11-29 PROCEDURE — 2500000005 HC RX 250 GENERAL PHARMACY W/O HCPCS: Performed by: INTERNAL MEDICINE

## 2023-11-29 PROCEDURE — 99153 MOD SED SAME PHYS/QHP EA: CPT | Performed by: INTERNAL MEDICINE

## 2023-11-29 PROCEDURE — C1894 INTRO/SHEATH, NON-LASER: HCPCS | Performed by: INTERNAL MEDICINE

## 2023-11-29 PROCEDURE — 99239 HOSP IP/OBS DSCHRG MGMT >30: CPT | Performed by: INTERNAL MEDICINE

## 2023-11-29 RX ORDER — HEPARIN SODIUM 1000 [USP'U]/ML
INJECTION, SOLUTION INTRAVENOUS; SUBCUTANEOUS AS NEEDED
Status: DISCONTINUED | OUTPATIENT
Start: 2023-11-29 | End: 2023-11-29 | Stop reason: HOSPADM

## 2023-11-29 RX ORDER — FENTANYL CITRATE 50 UG/ML
INJECTION, SOLUTION INTRAMUSCULAR; INTRAVENOUS AS NEEDED
Status: DISCONTINUED | OUTPATIENT
Start: 2023-11-29 | End: 2023-11-29 | Stop reason: HOSPADM

## 2023-11-29 RX ORDER — LIDOCAINE HYDROCHLORIDE 20 MG/ML
INJECTION, SOLUTION INFILTRATION; PERINEURAL AS NEEDED
Status: DISCONTINUED | OUTPATIENT
Start: 2023-11-29 | End: 2023-11-29 | Stop reason: HOSPADM

## 2023-11-29 RX ORDER — SODIUM CHLORIDE 9 MG/ML
1 INJECTION, SOLUTION INTRAVENOUS CONTINUOUS
Status: CANCELLED | OUTPATIENT
Start: 2023-11-29 | End: 2023-11-29

## 2023-11-29 RX ORDER — MIDAZOLAM HYDROCHLORIDE 1 MG/ML
INJECTION INTRAMUSCULAR; INTRAVENOUS AS NEEDED
Status: DISCONTINUED | OUTPATIENT
Start: 2023-11-29 | End: 2023-11-29 | Stop reason: HOSPADM

## 2023-11-29 RX ADMIN — IPRATROPIUM BROMIDE AND ALBUTEROL SULFATE 3 ML: .5; 3 SOLUTION RESPIRATORY (INHALATION) at 14:48

## 2023-11-29 RX ADMIN — PROPRANOLOL HYDROCHLORIDE 10 MG: 10 TABLET ORAL at 08:14

## 2023-11-29 RX ADMIN — PANTOPRAZOLE SODIUM 20 MG: 20 TABLET, DELAYED RELEASE ORAL at 06:48

## 2023-11-29 SDOH — SOCIAL STABILITY: SOCIAL INSECURITY: ARE THERE ANY APPARENT SIGNS OF INJURIES/BEHAVIORS THAT COULD BE RELATED TO ABUSE/NEGLECT?: NO

## 2023-11-29 SDOH — SOCIAL STABILITY: SOCIAL INSECURITY: HAS ANYONE EVER THREATENED TO HURT YOUR FAMILY OR YOUR PETS?: NO

## 2023-11-29 SDOH — SOCIAL STABILITY: SOCIAL INSECURITY: DOES ANYONE TRY TO KEEP YOU FROM HAVING/CONTACTING OTHER FRIENDS OR DOING THINGS OUTSIDE YOUR HOME?: NO

## 2023-11-29 SDOH — SOCIAL STABILITY: SOCIAL INSECURITY: DO YOU FEEL ANYONE HAS EXPLOITED OR TAKEN ADVANTAGE OF YOU FINANCIALLY OR OF YOUR PERSONAL PROPERTY?: NO

## 2023-11-29 SDOH — SOCIAL STABILITY: SOCIAL INSECURITY: DO YOU FEEL UNSAFE GOING BACK TO THE PLACE WHERE YOU ARE LIVING?: NO

## 2023-11-29 SDOH — SOCIAL STABILITY: SOCIAL INSECURITY: ARE YOU OR HAVE YOU BEEN THREATENED OR ABUSED PHYSICALLY, EMOTIONALLY, OR SEXUALLY BY ANYONE?: NO

## 2023-11-29 SDOH — SOCIAL STABILITY: SOCIAL INSECURITY: WERE YOU ABLE TO COMPLETE ALL THE BEHAVIORAL HEALTH SCREENINGS?: YES

## 2023-11-29 SDOH — SOCIAL STABILITY: SOCIAL INSECURITY: ABUSE: ADULT

## 2023-11-29 SDOH — SOCIAL STABILITY: SOCIAL INSECURITY: HAVE YOU HAD THOUGHTS OF HARMING ANYONE ELSE?: NO

## 2023-11-29 ASSESSMENT — PAIN SCALES - GENERAL
PAINLEVEL_OUTOF10: 0 - NO PAIN
PAINLEVEL_OUTOF10: 0 - NO PAIN

## 2023-11-29 ASSESSMENT — COGNITIVE AND FUNCTIONAL STATUS - GENERAL
MOBILITY SCORE: 24
DAILY ACTIVITIY SCORE: 24

## 2023-11-29 ASSESSMENT — PATIENT HEALTH QUESTIONNAIRE - PHQ9
2. FEELING DOWN, DEPRESSED OR HOPELESS: NOT AT ALL
SUM OF ALL RESPONSES TO PHQ9 QUESTIONS 1 & 2: 0
1. LITTLE INTEREST OR PLEASURE IN DOING THINGS: NOT AT ALL

## 2023-11-29 ASSESSMENT — PAIN - FUNCTIONAL ASSESSMENT: PAIN_FUNCTIONAL_ASSESSMENT: 0-10

## 2023-11-29 NOTE — POST-PROCEDURE NOTE
Physician Transition of Care Summary  Invasive Cardiovascular Lab    Procedure Date: 11/29/2023  Attending:    * Michael Craig - Primary     * Wali Torres  Resident/Fellow/Other Assistant: Surgeon(s) and Role:     * Wali Torres MD    Indications:   Pre-op Diagnosis     * Chest pain, unspecified type [R07.9]    Post-procedure diagnosis:   Post-op Diagnosis     * Chest pain, unspecified type [R07.9]    Procedure(s):     * Left Heart Cath    * Right Heart Cath      Procedure Findings:   LHC: Normal coronaries.   RHC: normal filling pressures with preserved CO and CI.    RA 3, RV 20/2, PA 19/1 (10), Wedge mean 3, LVEDP 14  PA sat 69%  CO 6.1, CI 2.6     Description of the Procedure:   LHC Access: Right radial artery, 6 Fr  Closure: TR band, 14     RHC: Right brachial vein, 5 Fr  Closure: Manual pressure    Complications:   None    Stents/Implants:       Anticoagulation/Antiplatelet Plan:   Per primary cardiology team    Estimated Blood Loss:   5 mL    Anesthesia: Moderate Sedation Anesthesia Staff: No anesthesia staff entered.    Any Specimen(s) Removed:   No specimens collected during this procedure.    Disposition:   [place] [id number]      Electronically signed by: Wali Torres MD, 11/29/2023 10:31 AM

## 2023-11-29 NOTE — DISCHARGE SUMMARY
Discharge Diagnosis  Shortness of Breath     Issues Requiring Follow-Up      Test Results Pending At Discharge  Pending Labs       No current pending labs.            Hospital Course  Gracy Kellogg is a 53 y.o. female with pmh of asthma, depression/anxiety who presents with subacute worsening MILLS (minimal exertion; getting dressed) as of 6mo ago. Endorses LE edema (trace), but no PND or orthopnea. Family hx significant for father with AR s/p surgery and grandfather who passed away of cardiac disease (unknown). She sees her pulmonologist yearly (last spirometry 2 years ago).     Denies CP, cough, nasal congestion or wheezing.   Pt underwent recent stress test showing normal EF 55%, and stable E/e' measurements during stress BUT with mild elevation of RVSP to 28 at exercise compared to rest 23. Dr. Carrion admitted patient for further work up of patient's dyspnea. L/RHC completed 11/29/23 showed normal coronaries and normal filling pressures. She will follow up with Dr. Carrion on Dec 12 and will also follow up with pulmonary for further work up. Patient notes her BP was good during her admission, but elevated at home. Reviewed with patient how to take BP, ensure proper cuff size, and to calibrate BP cuff.     No medication changes were made.     Discharge weight: 121 kg    After all labs and VS were reviewed the decision was made that the patient was medically stable for discharge.  The patient was discharged in satisfactory condition.    More than 30 minutes were spent in coordinating patient discharge.     Pertinent Physical Exam At Time of Discharge  Physical Exam  General: A&OX3 NAD  HEENT: No JVD  Lungs: CTA bilaterally  Cardiovascular: S1&S2 regular, no murmur, gallop or rub  Abdomen: soft, nontender, large girth, bowel sounds present  Extremities: no edema, pedal pulses palpable, right wrist no hematoma, pulse +2   Neuro: intact, normal affect    Home Medications     Medication List      CONTINUE taking  these medications     * albuterol 0.63 mg/3 mL nebulizer solution   * albuterol 90 mcg/actuation inhaler   amphetamine-dextroamphetamine 10 mg tablet; Commonly known as: Adderall   ARIPiprazole 2 mg tablet; Commonly known as: Abilify   azelastine 137 mcg (0.1 %) nasal spray; Commonly known as: Astelin   budesonide-formoteroL 160-4.5 mcg/actuation inhaler; Commonly known as:   Symbicort   cetirizine 10 mg tablet; Commonly known as: ZyrTEC   hydroCHLOROthiazide 25 mg tablet; Commonly known as: HYDRODiuril; Take 1   tablet (25 mg) by mouth once daily.   lamoTRIgine 200 mg tablet; Commonly known as: LaMICtal   losartan 100 mg tablet; Commonly known as: Cozaar   montelukast 10 mg tablet; Commonly known as: Singulair   omeprazole 20 mg tablet,delayed release (DR/EC) EC tablet; Commonly   known as: PriLOSEC   propranolol 10 mg tablet; Commonly known as: Inderal   sertraline 50 mg tablet; Commonly known as: Zoloft   zolpidem 10 mg tablet; Commonly known as: Ambien  * This list has 2 medication(s) that are the same as other medications   prescribed for you. Read the directions carefully, and ask your doctor or   other care provider to review them with you.       Outpatient Follow-Up  Future Appointments   Date Time Provider Department Center   12/12/2023  2:40 PM Marilu Carrion MD MPH [id number] [facility]   12/12/2023  8:00 PM SLEEP LAB [facility] ROOM 2 [facility]       EZEQUIEL Espana-CNP

## 2023-11-29 NOTE — HOSPITAL COURSE
Gracy Kellogg is a 53 y.o. female with pmh of asthma, depression/anxiety who presents with subacute worsening MILLS (minimal exertion; getting dressed) as of 6mo ago. Endorses LE edema (trace), but no PND or orthopnea. Family hx significant for father with AR s/p surgery and grandfather who passed away of cardiac disease (unknown). She sees her pulmonologist yearly (last spirometry 2 years ago).     Denies CP, cough, nasal congestion or wheezing.   Pt underwent recent stress test showing normal EF 55%, and stable E/e' measurements during stress BUT with mild elevation of RVSP to 28 at exercise compared to rest 23. Dr. Carrion admitted patient for further work up of patient's dyspnea. L/RHC completed 11/29/23 showed normal coronaries and normal filling pressures. She will follow up with Dr. Carrion on Dec 12 and will also follow up with pulmonary for further work up. Patient notes her BP was good during her admission, but elevated at home. Reviewed with patient how to take BP, ensure proper cuff size, and to calibrate BP cuff.     No medication changes were made.     Discharge weight: 121 kg    After all labs and VS were reviewed the decision was made that the patient was medically stable for discharge.  The patient was discharged in satisfactory condition.    More than 30 minutes were spent in coordinating patient discharge.

## 2023-11-29 NOTE — CARE PLAN
Problem: Pain - Adult  Goal: Verbalizes/displays adequate comfort level or baseline comfort level  Outcome: Progressing  Flowsheets (Taken 11/29/2023 0135)  Verbalizes/displays adequate comfort level or baseline comfort level: Encourage patient to monitor pain and request assistance     Problem: Safety - Adult  Goal: Free from fall injury  Outcome: Progressing     Problem: Discharge Planning  Goal: Discharge to home or other facility with appropriate resources  Outcome: Progressing     Problem: Chronic Conditions and Co-morbidities  Goal: Patient's chronic conditions and co-morbidity symptoms are monitored and maintained or improved  Outcome: Progressing

## 2023-12-01 ENCOUNTER — OFFICE VISIT (OUTPATIENT)
Dept: ENT CLINIC | Age: 53
End: 2023-12-01
Payer: COMMERCIAL

## 2023-12-01 VITALS — WEIGHT: 210 LBS | HEIGHT: 69 IN | BODY MASS INDEX: 31.1 KG/M2

## 2023-12-01 DIAGNOSIS — J30.9 ALLERGIC RHINITIS, UNSPECIFIED SEASONALITY, UNSPECIFIED TRIGGER: Primary | ICD-10-CM

## 2023-12-01 PROCEDURE — 99213 OFFICE O/P EST LOW 20 MIN: CPT | Performed by: OTOLARYNGOLOGY

## 2023-12-01 RX ORDER — AZELASTINE 1 MG/ML
2 SPRAY, METERED NASAL 2 TIMES DAILY
Qty: 120 ML | Refills: 3 | Status: SHIPPED | OUTPATIENT
Start: 2023-12-01

## 2023-12-01 RX ORDER — CETIRIZINE HYDROCHLORIDE 10 MG/1
10 TABLET ORAL DAILY
Qty: 30 TABLET | Refills: 0 | Status: SHIPPED | OUTPATIENT
Start: 2023-12-01 | End: 2023-12-31

## 2023-12-01 RX ORDER — HYDROCHLOROTHIAZIDE 25 MG/1
25 TABLET ORAL DAILY
COMMUNITY

## 2023-12-01 ASSESSMENT — ENCOUNTER SYMPTOMS
ABDOMINAL PAIN: 0
EYES NEGATIVE: 1
EYE PAIN: 0
EYE DISCHARGE: 0
SHORTNESS OF BREATH: 0
APNEA: 0
VOMITING: 0
DIARRHEA: 0
CHEST TIGHTNESS: 0
GASTROINTESTINAL NEGATIVE: 1
RESPIRATORY NEGATIVE: 1
COLOR CHANGE: 0

## 2023-12-01 ASSESSMENT — VISUAL ACUITY: OU: 1

## 2023-12-01 NOTE — PROGRESS NOTES
Subjective:      Patient ID:  Sunitha Alvarez is a 47 y.o. female. HPI:  Pt returns for recheck of allergies. History of   no surgery  When?  year:     Nasal Steroid: yes   Name: fluticasone (Flonase)   Still taking: yes   reason for stopping:     Other therapy:   Astelin- yes  oral antihistamine- Zyrtec  leukotriene inhibitor- Singulair  oral decongestant- none    which has been  effective     Pt has been on therapy for 4 month(s) and is doing well    Pt is doing well and the ears are much better    The patient is complaining of nasal congestion and rhinorrhea    The patients worst time of year is fall      Patient's medications, allergies, past medical, surgical, social and family histories were reviewed and updated as appropriate. Review of Systems   Constitutional: Negative. Negative for appetite change. HENT:  Positive for hearing loss, nosebleeds and postnasal drip. Ear pressure   Eyes: Negative. Negative for pain, discharge and visual disturbance. Respiratory: Negative. Negative for apnea, chest tightness and shortness of breath. Cardiovascular: Negative. Negative for chest pain, palpitations and leg swelling. Gastrointestinal: Negative. Negative for abdominal pain, diarrhea and vomiting. Endocrine: Negative for cold intolerance, heat intolerance and polydipsia. Genitourinary: Negative. Negative for dysuria, flank pain and hematuria. Musculoskeletal: Negative. Negative for arthralgias, gait problem and neck pain. Skin: Negative. Negative for color change, pallor and rash. Allergic/Immunologic: Positive for environmental allergies. Negative for food allergies and immunocompromised state. Neurological: Negative. Negative for dizziness, numbness and headaches. Hematological:  Negative for adenopathy. Psychiatric/Behavioral: Negative. Negative for behavioral problems and hallucinations.     All other systems reviewed and are

## 2023-12-04 ENCOUNTER — APPOINTMENT (OUTPATIENT)
Dept: CARDIOLOGY | Facility: HOSPITAL | Age: 53
End: 2023-12-04
Payer: COMMERCIAL

## 2023-12-12 ENCOUNTER — OFFICE VISIT (OUTPATIENT)
Dept: CARDIOLOGY | Facility: CLINIC | Age: 53
End: 2023-12-12
Payer: COMMERCIAL

## 2023-12-12 ENCOUNTER — APPOINTMENT (OUTPATIENT)
Dept: SLEEP MEDICINE | Facility: CLINIC | Age: 53
End: 2023-12-12
Payer: COMMERCIAL

## 2023-12-12 VITALS
RESPIRATION RATE: 16 BRPM | DIASTOLIC BLOOD PRESSURE: 83 MMHG | BODY MASS INDEX: 39.96 KG/M2 | HEART RATE: 94 BPM | OXYGEN SATURATION: 98 % | SYSTOLIC BLOOD PRESSURE: 136 MMHG | WEIGHT: 269.8 LBS | TEMPERATURE: 98 F | HEIGHT: 69 IN

## 2023-12-12 DIAGNOSIS — R06.02 SHORTNESS OF BREATH: Primary | ICD-10-CM

## 2023-12-12 PROCEDURE — 1036F TOBACCO NON-USER: CPT | Performed by: INTERNAL MEDICINE

## 2023-12-12 PROCEDURE — 99214 OFFICE O/P EST MOD 30 MIN: CPT | Performed by: INTERNAL MEDICINE

## 2023-12-12 NOTE — PROGRESS NOTES
"Advanced Heart Failure and Cardiac Transplantation Cardiology    Gracy Kellogg is a 53 y.o. 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 She is here for follow up to discuss recent hospitalization and testing results.    Echocardiogram 11/2023:   1. The resting ejection fraction was estimated at 55 to 60% with a peak exercise ejection fraction estimated at 65 to 70%.   2. Normal global left ventricular systolic function.   3. Dyspnea protocol showed mild increase of RVSP above baseline (from 23 mmHg at rest to 28 mmHg with stress) and stable E/e' (from 9 at rest to 7 with stress). No desaturations present during stress test.   4. Adequate level of stress achieved.   5. No electrocardiographic, echocardiographic or clinical evidence for ischemia at a maximal workload.   6. No ischemia by ECG or echo at max workload at borderline adequate heart rate response. Only having achieved 84% MPHR.   7. The blunted heart rate diminshes the sensitivity of this test.    Catheterization 11/29/2023:   1. No evidence of significant coronary artery disease.   2. Left Ventricular end-diastolic pressure = 14.    Exam: /83   Pulse 94   Temp 36.7 °C (98 °F)   Resp 16   Ht 1.753 m (5' 9\")   Wt 122 kg (269 lb 12.8 oz)   SpO2 98%   BMI 39.84 kg/m²   Obesity  No JVD  RRR no murmurs  CTA  No LE edema    Current Outpatient Medications   Medication Instructions    albuterol 0.63 mg/3 mL nebulizer solution 3 mL, nebulization, Every 6 hours PRN    albuterol 90 mcg/actuation inhaler 2 puffs, inhalation, Every 6 hours PRN    amphetamine-dextroamphetamine (Adderall) 10 mg tablet 1 tablet, oral, As needed    ARIPiprazole (ABILIFY) 2 mg, oral, Daily    azelastine (Astelin) 137 mcg (0.1 %) nasal spray 1 spray, nasal    budesonide-formoteroL (Symbicort) 160-4.5 mcg/actuation inhaler 2 puffs, inhalation, 2 times daily    cetirizine (ZyrTEC) 10 mg tablet 1 tablet, oral, Daily    hydroCHLOROthiazide (HYDRODIURIL) 25 mg, oral, Daily    lamoTRIgine " "(LaMICtal) 200 mg tablet 1 tablet, oral, Daily    losartan (COZAAR) 100 mg, oral, Daily    montelukast (Singulair) 10 mg tablet 1 tablet, oral, Daily    omeprazole (PriLOSEC) 20 mg tablet,delayed release (DR/EC) EC tablet 1 tablet, oral, Daily    propranolol (Inderal) 10 mg tablet 1 tablet, oral, 2 times daily    sertraline (ZOLOFT) 100 mg, oral, Daily    zolpidem (AMBIEN) 10 mg, oral, Nightly PRN     Past medical history (non-cardiac):  -- Hypertension  -- Bipolar disease, type 1 (depression > abelardo)  -- Past smoker, quit >15 years  -- Depression, on Adderall 10mg QD or BID as an \"upper\" (used to be on 30mg TID 5-6 years ago)  -- Asthma on singulair, symbicort  -- Allergies  -- GERD on prilosec  -- History of gastric ulcers  -- History of miscarriages and subsequently diagnosed with Clotting disorder (NTFHR); no other known blood clots  -- +Snores  -- Uterine ablation  -- Broken ankle s/p surgical repair    Assessment: 53WF with progressive (1) shortness of breath and dyspnea on exertion, (2) chest pain with exertion, (3) fatigue, and (4) palpitations. Cardiac testing non-revealing to date.    Plan:  -- RHC and coronary angiogram results non-revealing  -- Pulmonology evaluation pending    Marilu Carrion MD, MPH  Advanced Heart Failure and Transplant Cardiology  [facility]  [facility]    "

## 2023-12-12 NOTE — PATIENT INSTRUCTIONS
To reach Dr. Carrion's office please call [phone] ([facility]). Fax [phone]. Call [phone] to schedule an appointment. You may also contact the HF RNs at [email or web address]

## 2023-12-19 ENCOUNTER — CLINICAL SUPPORT (OUTPATIENT)
Dept: SLEEP MEDICINE | Facility: CLINIC | Age: 53
End: 2023-12-19
Payer: COMMERCIAL

## 2023-12-19 DIAGNOSIS — G47.33 OBSTRUCTIVE SLEEP APNEA (ADULT) (PEDIATRIC): ICD-10-CM

## 2023-12-19 DIAGNOSIS — R06.83 SNORING: ICD-10-CM

## 2023-12-19 PROCEDURE — 95806 SLEEP STUDY UNATT&RESP EFFT: CPT | Performed by: STUDENT IN AN ORGANIZED HEALTH CARE EDUCATION/TRAINING PROGRAM

## 2023-12-19 NOTE — PROGRESS NOTES
Type of Study: HOME SLEEP STUDY - NOMAD     The patient received equipment and instructions for use of the [employer] Nomad HSAT device. The patient was instructed how to apply the effort belts, cannula, thermistor. It was also explained how the Nomad and oximeter components work.  The patient was asked to record their sleep for an 8-hour period.     The patient was informed of their responsibility for the device and acknowledged this by signing the HSAT device contract. The patient was asked to return the device on 12/20/2023 by 10 AM to the Respiratory Care Department at [facility].     The patient was instructed to call 911 as usual for any medical- emergencies while at home.  The patient was also given a phone number for troubleshooting when using the device in case there were additional questions.

## 2024-04-12 ENCOUNTER — HOSPITAL ENCOUNTER (OUTPATIENT)
Dept: SLEEP CENTER | Age: 54
Discharge: HOME | End: 2024-04-12
Payer: COMMERCIAL

## 2024-04-12 DIAGNOSIS — G47.33 OSA (OBSTRUCTIVE SLEEP APNEA): ICD-10-CM

## 2024-04-12 PROCEDURE — 95811 POLYSOM 6/>YRS CPAP 4/> PARM: CPT

## 2024-04-13 VITALS
HEART RATE: 75 BPM | SYSTOLIC BLOOD PRESSURE: 173 MMHG | HEIGHT: 69 IN | BODY MASS INDEX: 37.03 KG/M2 | OXYGEN SATURATION: 95 % | DIASTOLIC BLOOD PRESSURE: 70 MMHG | WEIGHT: 250 LBS

## 2024-04-13 ASSESSMENT — SLEEP AND FATIGUE QUESTIONNAIRES
HOW LIKELY ARE YOU TO NOD OFF OR FALL ASLEEP WHILE SITTING AND READING: WOULD NEVER DOZE
HOW LIKELY ARE YOU TO NOD OFF OR FALL ASLEEP IN A CAR, WHILE STOPPED FOR A FEW MINUTES IN TRAFFIC: WOULD NEVER DOZE
HOW LIKELY ARE YOU TO NOD OFF OR FALL ASLEEP WHILE SITTING QUIETLY AFTER LUNCH WITHOUT ALCOHOL: WOULD NEVER DOZE
HOW LIKELY ARE YOU TO NOD OFF OR FALL ASLEEP WHEN YOU ARE A PASSENGER IN A CAR FOR AN HOUR WITHOUT A BREAK: SLIGHT CHANCE OF DOZING
HOW LIKELY ARE YOU TO NOD OFF OR FALL ASLEEP WHILE LYING DOWN TO REST IN THE AFTERNOON WHEN CIRCUMSTANCES PERMIT: SLIGHT CHANCE OF DOZING
HOW LIKELY ARE YOU TO NOD OFF OR FALL ASLEEP WHILE WATCHING TV: MODERATE CHANCE OF DOZING
HOW LIKELY ARE YOU TO NOD OFF OR FALL ASLEEP WHILE SITTING AND TALKING TO SOMEONE: WOULD NEVER DOZE
ESS TOTAL SCORE: 4
HOW LIKELY ARE YOU TO NOD OFF OR FALL ASLEEP WHILE SITTING INACTIVE IN A PUBLIC PLACE: WOULD NEVER DOZE

## 2024-10-30 DIAGNOSIS — R06.02 SHORTNESS OF BREATH: Primary | ICD-10-CM

## 2024-10-30 DIAGNOSIS — I10 HYPERTENSION, UNSPECIFIED TYPE: ICD-10-CM

## 2024-10-31 RX ORDER — HYDROCHLOROTHIAZIDE 25 MG/1
25 TABLET ORAL DAILY
Qty: 30 TABLET | Refills: 2 | Status: SHIPPED | OUTPATIENT
Start: 2024-10-31 | End: 2025-01-29

## 2024-11-20 PROBLEM — J45.901 EXACERBATION OF ASTHMA: Status: ACTIVE | Noted: 2018-07-23

## 2024-12-06 ENCOUNTER — OFFICE VISIT (OUTPATIENT)
Dept: ENT CLINIC | Age: 54
End: 2024-12-06
Payer: COMMERCIAL

## 2024-12-06 VITALS
BODY MASS INDEX: 40.23 KG/M2 | DIASTOLIC BLOOD PRESSURE: 83 MMHG | TEMPERATURE: 97 F | HEART RATE: 86 BPM | WEIGHT: 271.6 LBS | SYSTOLIC BLOOD PRESSURE: 145 MMHG | OXYGEN SATURATION: 97 % | HEIGHT: 69 IN

## 2024-12-06 DIAGNOSIS — H69.93 EUSTACHIAN TUBE DYSFUNCTION, BILATERAL: ICD-10-CM

## 2024-12-06 DIAGNOSIS — J30.9 ALLERGIC RHINITIS, UNSPECIFIED SEASONALITY, UNSPECIFIED TRIGGER: Primary | ICD-10-CM

## 2024-12-06 PROCEDURE — 99213 OFFICE O/P EST LOW 20 MIN: CPT | Performed by: OTOLARYNGOLOGY

## 2024-12-06 RX ORDER — AZELASTINE 1 MG/ML
1 SPRAY, METERED NASAL 2 TIMES DAILY
Qty: 30 ML | Refills: 3 | Status: SHIPPED | OUTPATIENT
Start: 2024-12-06

## 2024-12-06 ASSESSMENT — ENCOUNTER SYMPTOMS
EYES NEGATIVE: 1
CHEST TIGHTNESS: 0
GASTROINTESTINAL NEGATIVE: 1
EYE DISCHARGE: 0
EYE PAIN: 0
COLOR CHANGE: 0
ABDOMINAL PAIN: 0
SHORTNESS OF BREATH: 0
APNEA: 0
VOMITING: 0
RESPIRATORY NEGATIVE: 1
DIARRHEA: 0

## 2024-12-06 ASSESSMENT — VISUAL ACUITY: OU: 1

## 2024-12-06 NOTE — PROGRESS NOTES
Subjective:      Patient ID:  Shivani Ward is a 54 y.o. female.    HPI:  Pt returns for recheck of allergies.       History of   no surgery  When?  year:     Nasal Steroid: yes   Name: fluticasone (Flonase)   Still taking: yes   reason for stopping:     Other therapy:   Astelin- yes  oral antihistamine- Zyrtec  leukotriene inhibitor- Singulair  oral decongestant- none    which has been  effective     Pt has been on therapy for 10 month(s) and is doing well    Pt is doing well. Still with some post nasal drainage and ear fullness. Ears do feel better today.    The patient is complaining of nasal congestion and rhinorrhea    The patients worst time of year is fall      Patient's medications, allergies, past medical, surgical, social and family histories were reviewed and updated as appropriate.        Review of Systems   Constitutional: Negative.  Negative for appetite change.   HENT:  Positive for hearing loss and postnasal drip. Negative for nosebleeds.         Ear pressure   Eyes: Negative.  Negative for pain, discharge and visual disturbance.   Respiratory: Negative.  Negative for apnea, chest tightness and shortness of breath.    Cardiovascular: Negative.  Negative for chest pain, palpitations and leg swelling.   Gastrointestinal: Negative.  Negative for abdominal pain, diarrhea and vomiting.   Endocrine: Negative for cold intolerance, heat intolerance and polydipsia.   Genitourinary: Negative.  Negative for dysuria, flank pain and hematuria.   Musculoskeletal: Negative.  Negative for arthralgias, gait problem and neck pain.   Skin: Negative.  Negative for color change, pallor and rash.   Allergic/Immunologic: Positive for environmental allergies. Negative for food allergies and immunocompromised state.   Neurological: Negative.  Negative for dizziness, numbness and headaches.   Hematological:  Negative for adenopathy.   Psychiatric/Behavioral: Negative.  Negative for behavioral problems and hallucinations.

## 2025-07-03 DIAGNOSIS — J30.9 ALLERGIC RHINITIS, UNSPECIFIED SEASONALITY, UNSPECIFIED TRIGGER: Primary | ICD-10-CM

## 2025-07-03 RX ORDER — AZELASTINE HYDROCHLORIDE 137 UG/1
SPRAY, METERED NASAL
Qty: 30 ML | Refills: 0 | Status: SHIPPED | OUTPATIENT
Start: 2025-07-03

## (undated) DEVICE — HEWSON SUTURE RETRIEVER: Brand: HEWSON SUTURE RETRIEVER

## (undated) DEVICE — SPONGE LAP W18XL18IN WHT COT 4 PLY FLD STRUNG RADPQ DISP ST

## (undated) DEVICE — GLOVE SURG SZ 8 L12IN FNGR THK94MIL STD WHT LTX FREE

## (undated) DEVICE — SET ORTHO STD STORTSTD1

## (undated) DEVICE — 3M™ STERI-DRAPE™ U-DRAPE 1015: Brand: STERI-DRAPE™

## (undated) DEVICE — GLOVE SURG SZ 8 CRM LTX FREE POLYISOPRENE POLYMER BEAD ANTI

## (undated) DEVICE — GAUZE,SPONGE,4"X4",8PLY,STRL,LF,10/TRAY: Brand: MEDLINE

## (undated) DEVICE — TAPE ADH W3INXL10YD WHT COT WVN BK POWERFUL RUB BASE HIGHLY

## (undated) DEVICE — GUIDEWIRE, INQWIRE, 3MM J, .035, 260

## (undated) DEVICE — DRESSING,GAUZE,XEROFORM,CURAD,1"X8",ST: Brand: CURAD

## (undated) DEVICE — SPLINT CAST W5INXL45IN CRM PLSTR FAST SET W CTRL PLAS COR N

## (undated) DEVICE — SCREW BNE L28MM DIA3.5MM CORT S STL ST NONCANNULATED LOK
Type: IMPLANTABLE DEVICE | Site: ANKLE | Status: NON-FUNCTIONAL
Removed: 2020-12-19

## (undated) DEVICE — TOTAL KNEE PK

## (undated) DEVICE — TUBING SUCT 12FR MAL ALUM SHFT FN CAP VENT UNIV CONN W/ OBT

## (undated) DEVICE — Device

## (undated) DEVICE — DRAPE,REIN 53X77,STERILE: Brand: MEDLINE

## (undated) DEVICE — ZIMMER® STERILE DISPOSABLE TOURNIQUET CUFF WITH PLC, DUAL PORT, SINGLE BLADDER, 34 IN. (86 CM)

## (undated) DEVICE — BIT DRL 3 FLUT 2.7X125 MM QC SS STRL LCP

## (undated) DEVICE — DRAPE,TOP,102X53,STERILE: Brand: MEDLINE

## (undated) DEVICE — NITINOL KIT, GLIDESHEATH, 5FR

## (undated) DEVICE — PACK PROCEDURE SURG GEN CUST

## (undated) DEVICE — SCREW BNE L50MM DIA4MM S STL CANN SHT 1/3 THRD SM HEX SOCK
Type: IMPLANTABLE DEVICE | Site: ANKLE | Status: NON-FUNCTIONAL
Removed: 2020-12-19

## (undated) DEVICE — GUIDEWIRE ORTH L150MM DIA1.25MM S STL NTHRD FOR 4MM CANN

## (undated) DEVICE — TOWEL,OR,DSP,ST,BLUE,STD,6/PK,12PK/CS: Brand: MEDLINE

## (undated) DEVICE — BNDG,ELSTC,MATRIX,STRL,4"X5YD,LF,HOOK&LP: Brand: MEDLINE

## (undated) DEVICE — INSTRUMENT SYSTEM 4 BATTERY REUSABLE

## (undated) DEVICE — BIT DRL L125MM DIA2MM S STL QUIK CPL FOR LOK COMPR PLT

## (undated) DEVICE — CATHETER, OPTITORQUE, 5FR, TIG, 1H/100CM

## (undated) DEVICE — BIT DRL L160MM DIA2.7MM ST CANN QUIK CPL NONRADIOLUCENT ADJ

## (undated) DEVICE — ELECTRODE PT RET AD L9FT HI MOIST COND ADH HYDRGEL CORDED

## (undated) DEVICE — BIT DRL L110MM DIA2.5MM ST G QUIK CPL NONRADIOPAQUE W/O STP

## (undated) DEVICE — GOWN,SIRUS,POLYRNF,SETINSLV,XL,20/CS: Brand: MEDLINE

## (undated) DEVICE — PADDING,UNDERCAST,COTTON, 4"X4YD STERILE: Brand: MEDLINE

## (undated) DEVICE — DRAPE C ARM W41XL74IN UNIV MOB W RUBBERBAND CLP

## (undated) DEVICE — Z DISCONTINUED USE 2275686 GLOVE SURG SZ 8 L12IN FNGR THK13MIL WHT ISOLEX POLYISOPRENE

## (undated) DEVICE — DOUBLE BASIN SET: Brand: MEDLINE INDUSTRIES, INC.

## (undated) DEVICE — TUBING, SUCTION, 9/32" X 10', STRAIGHT: Brand: MEDLINE

## (undated) DEVICE — SUTURE TIGERTAPE TIGERWIRE SZ 2-0 L30IN NONABSORBABLE AR72377T

## (undated) DEVICE — TRAY SYNTHES LCP SMALL FRAG SET REUSABLE

## (undated) DEVICE — CATHETER, SWAN GANZ, DBL LUMEN, MONITORING, 5 FR

## (undated) DEVICE — INTENDED FOR TISSUE SEPARATION, AND OTHER PROCEDURES THAT REQUIRE A SHARP SURGICAL BLADE TO PUNCTURE OR CUT.: Brand: BARD-PARKER ® STAINLESS STEEL BLADES

## (undated) DEVICE — TR BAND, RADIAL COMPRESSION, STANDARD, 24CM

## (undated) DEVICE — CHLORAPREP 26ML ORANGE

## (undated) DEVICE — BANDAGE COMPR W6INXL12FT SMOOTH FOR LIMB EXSANG ESMARCH

## (undated) DEVICE — SUTURE ABSORBABLE BRAIDED 2-0 CT-1 27 IN UD VICRYL J259H

## (undated) DEVICE — CATHETER, ANGIO, IMPULSE, FR4, 5 FR X 100 CM

## (undated) DEVICE — TRAY DRILL SYSTEM 4 REUSABLE

## (undated) DEVICE — 4-PORT MANIFOLD: Brand: NEPTUNE 2

## (undated) DEVICE — ACCESS KIT, S-MAK MINI, 4FR 10CM 0.018IN 40CM, NT/PT, ECHO ENHANCE NEEDLE

## (undated) DEVICE — TRAY LAMBOTS REUSABLE

## (undated) DEVICE — PADDING CAST W6INXL4YD COT LO LINTING WYTEX

## (undated) DEVICE — SUTURE ETHLN SZ 3-0 L18IN NONABSORBABLE BLK L24MM PS-1 3/8 1663G

## (undated) DEVICE — CATHETER, DIAGNOSTIC, 4FR-IM

## (undated) DEVICE — CLOTH SURG PREP PREOPERATIVE CHLORHEXIDINE GLUC 2% READYPREP

## (undated) DEVICE — SUTURE SUTTAPE L40IN DIA1.3MM NONABSORBABLE WHT BLU L26.5MM AR7500

## (undated) DEVICE — COVER HNDL LT DISP

## (undated) DEVICE — SHEATH, GLIDESHEATH, SLENDER, 6FR 10CM